# Patient Record
Sex: MALE | Race: WHITE | Employment: FULL TIME | URBAN - METROPOLITAN AREA
[De-identification: names, ages, dates, MRNs, and addresses within clinical notes are randomized per-mention and may not be internally consistent; named-entity substitution may affect disease eponyms.]

---

## 2019-11-30 ENCOUNTER — APPOINTMENT (OUTPATIENT)
Dept: GENERAL RADIOLOGY | Facility: HOSPITAL | Age: 64
DRG: 247 | End: 2019-11-30
Attending: EMERGENCY MEDICINE
Payer: COMMERCIAL

## 2019-11-30 ENCOUNTER — HOSPITAL ENCOUNTER (INPATIENT)
Facility: HOSPITAL | Age: 64
LOS: 1 days | Discharge: HOME | DRG: 247 | End: 2019-12-01
Attending: EMERGENCY MEDICINE | Admitting: HOSPITALIST
Payer: COMMERCIAL

## 2019-11-30 ENCOUNTER — APPOINTMENT (OUTPATIENT)
Dept: CV DIAGNOSTICS | Facility: HOSPITAL | Age: 64
DRG: 247 | End: 2019-11-30
Attending: INTERNAL MEDICINE
Payer: COMMERCIAL

## 2019-11-30 ENCOUNTER — APPOINTMENT (OUTPATIENT)
Dept: INTERVENTIONAL RADIOLOGY/VASCULAR | Facility: HOSPITAL | Age: 64
DRG: 247 | End: 2019-11-30
Attending: EMERGENCY MEDICINE
Payer: COMMERCIAL

## 2019-11-30 DIAGNOSIS — R77.8 ELEVATED TROPONIN: ICD-10-CM

## 2019-11-30 DIAGNOSIS — I10 BENIGN ESSENTIAL HTN: ICD-10-CM

## 2019-11-30 DIAGNOSIS — I24.9 ACUTE CORONARY SYNDROME (HCC): Primary | ICD-10-CM

## 2019-11-30 PROBLEM — R79.89 ELEVATED TROPONIN: Status: ACTIVE | Noted: 2019-11-30

## 2019-11-30 PROBLEM — I20.0 UNSTABLE ANGINA (HCC): Status: ACTIVE | Noted: 2019-11-30

## 2019-11-30 PROCEDURE — 99222 1ST HOSP IP/OBS MODERATE 55: CPT | Performed by: HOSPITALIST

## 2019-11-30 PROCEDURE — 99253 IP/OBS CNSLTJ NEW/EST LOW 45: CPT | Performed by: INTERNAL MEDICINE

## 2019-11-30 PROCEDURE — 93458 L HRT ARTERY/VENTRICLE ANGIO: CPT | Performed by: INTERNAL MEDICINE

## 2019-11-30 PROCEDURE — B211YZZ FLUOROSCOPY OF MULTIPLE CORONARY ARTERIES USING OTHER CONTRAST: ICD-10-PCS | Performed by: INTERNAL MEDICINE

## 2019-11-30 PROCEDURE — 93306 TTE W/DOPPLER COMPLETE: CPT | Performed by: INTERNAL MEDICINE

## 2019-11-30 PROCEDURE — B41FYZZ FLUOROSCOPY OF RIGHT LOWER EXTREMITY ARTERIES USING OTHER CONTRAST: ICD-10-PCS | Performed by: INTERNAL MEDICINE

## 2019-11-30 PROCEDURE — 71045 X-RAY EXAM CHEST 1 VIEW: CPT | Performed by: EMERGENCY MEDICINE

## 2019-11-30 PROCEDURE — 99255 IP/OBS CONSLTJ NEW/EST HI 80: CPT | Performed by: INTERNAL MEDICINE

## 2019-11-30 PROCEDURE — 3E033GC INTRODUCTION OF OTHER THERAPEUTIC SUBSTANCE INTO PERIPHERAL VEIN, PERCUTANEOUS APPROACH: ICD-10-PCS | Performed by: INTERNAL MEDICINE

## 2019-11-30 PROCEDURE — 0271356 DILATION OF CORONARY ARTERY, TWO ARTERIES, BIFURCATION, WITH TWO DRUG-ELUTING INTRALUMINAL DEVICES, PERCUTANEOUS APPROACH: ICD-10-PCS | Performed by: INTERNAL MEDICINE

## 2019-11-30 PROCEDURE — 92941 PRQ TRLML REVSC TOT OCCL AMI: CPT | Performed by: INTERNAL MEDICINE

## 2019-11-30 PROCEDURE — 4A023N7 MEASUREMENT OF CARDIAC SAMPLING AND PRESSURE, LEFT HEART, PERCUTANEOUS APPROACH: ICD-10-PCS | Performed by: INTERNAL MEDICINE

## 2019-11-30 RX ORDER — BIVALIRUDIN 250 MG/5ML
INJECTION, POWDER, LYOPHILIZED, FOR SOLUTION INTRAVENOUS
Status: COMPLETED
Start: 2019-11-30 | End: 2019-11-30

## 2019-11-30 RX ORDER — METOPROLOL TARTRATE 5 MG/5ML
5 INJECTION INTRAVENOUS ONCE
Status: COMPLETED | OUTPATIENT
Start: 2019-11-30 | End: 2019-11-30

## 2019-11-30 RX ORDER — ASPIRIN 81 MG/1
324 TABLET, CHEWABLE ORAL ONCE
Status: DISCONTINUED | OUTPATIENT
Start: 2019-11-30 | End: 2019-11-30

## 2019-11-30 RX ORDER — ONDANSETRON 2 MG/ML
4 INJECTION INTRAMUSCULAR; INTRAVENOUS EVERY 6 HOURS PRN
Status: DISCONTINUED | OUTPATIENT
Start: 2019-11-30 | End: 2019-12-01

## 2019-11-30 RX ORDER — ASPIRIN 81 MG/1
81 TABLET ORAL DAILY
Status: DISCONTINUED | OUTPATIENT
Start: 2019-11-30 | End: 2019-12-01

## 2019-11-30 RX ORDER — NITROGLYCERIN 20 MG/100ML
40 INJECTION INTRAVENOUS CONTINUOUS
Status: DISCONTINUED | OUTPATIENT
Start: 2019-11-30 | End: 2019-12-01

## 2019-11-30 RX ORDER — ONDANSETRON 2 MG/ML
4 INJECTION INTRAMUSCULAR; INTRAVENOUS EVERY 4 HOURS PRN
Status: DISCONTINUED | OUTPATIENT
Start: 2019-11-30 | End: 2019-11-30

## 2019-11-30 RX ORDER — NITROGLYCERIN 20 MG/100ML
INJECTION INTRAVENOUS CONTINUOUS
Status: DISCONTINUED | OUTPATIENT
Start: 2019-11-30 | End: 2019-11-30

## 2019-11-30 RX ORDER — MIDAZOLAM HYDROCHLORIDE 1 MG/ML
INJECTION INTRAMUSCULAR; INTRAVENOUS
Status: COMPLETED
Start: 2019-11-30 | End: 2019-11-30

## 2019-11-30 RX ORDER — SODIUM CHLORIDE 9 MG/ML
INJECTION, SOLUTION INTRAVENOUS CONTINUOUS
Status: ACTIVE | OUTPATIENT
Start: 2019-11-30 | End: 2019-11-30

## 2019-11-30 RX ORDER — ACETAMINOPHEN 325 MG/1
650 TABLET ORAL EVERY 6 HOURS PRN
Status: DISCONTINUED | OUTPATIENT
Start: 2019-11-30 | End: 2019-12-01

## 2019-11-30 RX ORDER — METOCLOPRAMIDE HYDROCHLORIDE 5 MG/ML
10 INJECTION INTRAMUSCULAR; INTRAVENOUS EVERY 8 HOURS PRN
Status: DISCONTINUED | OUTPATIENT
Start: 2019-11-30 | End: 2019-12-01

## 2019-11-30 RX ORDER — LIDOCAINE HYDROCHLORIDE 10 MG/ML
INJECTION, SOLUTION EPIDURAL; INFILTRATION; INTRACAUDAL; PERINEURAL
Status: COMPLETED
Start: 2019-11-30 | End: 2019-11-30

## 2019-11-30 RX ORDER — ASPIRIN 81 MG/1
81 TABLET ORAL DAILY
Status: DISCONTINUED | OUTPATIENT
Start: 2019-12-01 | End: 2019-11-30

## 2019-11-30 RX ORDER — ATORVASTATIN CALCIUM 80 MG/1
80 TABLET, FILM COATED ORAL NIGHTLY
Status: DISCONTINUED | OUTPATIENT
Start: 2019-11-30 | End: 2019-12-01

## 2019-11-30 RX ORDER — SODIUM CHLORIDE 9 MG/ML
INJECTION, SOLUTION INTRAVENOUS CONTINUOUS
Status: DISCONTINUED | OUTPATIENT
Start: 2019-11-30 | End: 2019-11-30

## 2019-11-30 RX ORDER — ASPIRIN 81 MG/1
325 TABLET, CHEWABLE ORAL NIGHTLY
Status: ON HOLD | COMMUNITY
End: 2019-12-01

## 2019-11-30 NOTE — CONSULTS
Cardiology (consult dictated)    Assessment:  1. Acute myocardial infarction, likely circumflex distribution. 2.  Known CAD, status post PCI approximately 1999.    3.  Well-documented heparin allergy    Plan:  1.   Emergency cardiac catheterization, pos

## 2019-11-30 NOTE — PROCEDURES
Preop: Acute myocardial infarction  Postop: Acute myocardial infarction  Procedure: Left heart cath, coronary angiogram.  PCI of circumflex with SREEKANTH. Angio-Seal to RFA. Findings: LAD 50% proximal to mid vessel.   RCA totally occluded with good collatera

## 2019-11-30 NOTE — PROCEDURES
[facility]    PATIENT'S NAME: Guerrero Becerra   ATTENDING PHYSICIAN: Emerson Ríos M.D. OPERATING PHYSICIAN: Maye Schuster M.D.    PATIENT ACCOUNT#:   [de-identified]    LOCATION:  Count includes the [facility] [id number] [facility]  MEDICAL RECORD #:   [id number]       DATE OF BIRTH:  [birth year] the proximal vessel, which begins before then extends into what appears to be a previous stent. Degree of stenosis 70% to 80%. The culprit lesion is the bifurcation of the marginal and posterolateral branches in the distal circumflex.   Subtotal stenosis patency. We then did primary stenting of the main circumflex trunk extending into the second marginal branch using a 3.0 x 23 mm drug-eluting stent with excellent results.   It was necessary to predilate the more proximal vessel in order to pass the stent,

## 2019-11-30 NOTE — DIETARY NOTE
Nutrition Short Note    Dietitian consult received per cardiac rehab/CHF protocol. Pt to be educated by cardiac rehab staff and encouraged to attend outpatient classes taught by RD. SYDNIE available PRN.     Dillon Diego RD, LDN, CSP, CNSC

## 2019-11-30 NOTE — CONSULTS
[facility]    PATIENT'S NAME: Jane Flynn   ATTENDING PHYSICIAN: IMLAN Argueta: Korin Lino M.D.    PATIENT ACCOUNT#:   [de-identified]    LOCATION:  [address] [place] [address]  MEDICAL RECORD #:   [id number]       DATE OF BIRTH:  [birth year] click.  ABDOMEN:  Soft, nontender. Bowel sounds present. No organomegaly, masses, bruits, or pulsations. EXTREMITIES:  Warm and dry. Pulses strong and equal.  No cyanosis, clubbing, or edema. No calf, thigh, or popliteal tenderness.    NEUROLOGIC:  No

## 2019-11-30 NOTE — ED NOTES
PATIENT HANDOFF AT BEDSIDE TO CATH LAB MD [facility], [address] [facility] LAB TEAM  REPORT GIVEN TO Bay Boyd [id number] RN

## 2019-11-30 NOTE — PLAN OF CARE
Received pt from Cath lab s/p L heart cath, stentsx2 to circumflex. R groin angioseal CDI. Pulses + CMS intact. On NTG gtt at 40 mcg/min, IVF at 50 cchr x 4 hrs. 12 lead EKG done and Dr. Gloria Velázquez reviewed it.

## 2019-11-30 NOTE — ED INITIAL ASSESSMENT (HPI)
Radha PAULINO 16. Chest radiating to arm when pt lies down  + arm tingling  + stent (L dec art) 1999

## 2019-11-30 NOTE — CONSULTS
[facility] Consult Note    Reason for Consult: History of HIT  Medical Record Number: [id number]   CSN: [id number]   Referring Physician: No ref.  provider found  PCP: None Pcp    History of Present Illness: 64M with a PMH of CAD and \"hep level: Not on file    Tobacco Use      Smoking status: Current Every Day Smoker        Packs/day: 0.50        Years: 25.00        Pack years: 12.5        Types: Cigarettes      Smokeless tobacco: Never Used    Substance and Sexual Activity      Alcohol use follow peripherally     [facility]

## 2019-11-30 NOTE — ED PROVIDER NOTES
Artery disease. He had a stent to his LAD in 1999.   Patient Seen in: [facility] Emergency Department      History   Patient presents with:  Chest Pain Angina (cardiovascular)    Stated Complaint: [address] Chest radiating to arm when pt lies down src --    SpO2 11/30/19 0200 95 %   O2 Device --        Current:/74   Pulse 75   Resp 16   Ht 185.4 cm (6' 1\")   Wt 95.3 kg   SpO2 97%   BMI 27.71 kg/m²         Physical Exam    General:  Patient is alert and oriented x3. No acute distress.   Well-d PLATELET.   Procedure                               Abnormality         Status                     ---------                               -----------         ------                     CBC W/ DIFFERENTIAL[[id number]]                              Final resul bilateral arm discomfort. He states that his chest pain had resolved. However his EKG showed persistent inferior lateral ST segment depression but with new J-point elevation anteriorly concerning for ST segment elevation myocardial infarction.   Cardiac a

## 2019-11-30 NOTE — H&P
[facility]  History and Physical     Tyrelreina Hightower Patient Status:  Emergency    10/4/1955 MRN [id number]   Location [facility] Attending Jaiden Alvarez MD   Hosp Day # 0 PCP None Pcp     Chief Complaint: Chest pain    H membranes. EOM-I. PERRLA. Anicteric. Neck: No lymphadenopathy. No JVD. No carotid bruits. Respiratory: Clear to auscultation bilaterally. No wheezes. No rhonchi. Cardiovascular: S1, S2. Regular rate and rhythm. No murmurs, rubs or gallops. Equal pulses.

## 2019-12-01 VITALS
OXYGEN SATURATION: 98 % | WEIGHT: 207.88 LBS | HEART RATE: 66 BPM | DIASTOLIC BLOOD PRESSURE: 76 MMHG | HEIGHT: 73 IN | BODY MASS INDEX: 27.55 KG/M2 | RESPIRATION RATE: 15 BRPM | TEMPERATURE: 98 F | SYSTOLIC BLOOD PRESSURE: 104 MMHG

## 2019-12-01 PROCEDURE — 99239 HOSP IP/OBS DSCHRG MGMT >30: CPT | Performed by: STUDENT IN AN ORGANIZED HEALTH CARE EDUCATION/TRAINING PROGRAM

## 2019-12-01 PROCEDURE — 99232 SBSQ HOSP IP/OBS MODERATE 35: CPT | Performed by: INTERNAL MEDICINE

## 2019-12-01 RX ORDER — ASPIRIN 81 MG/1
81 TABLET ORAL DAILY
Qty: 90 TABLET | Refills: 1 | Status: SHIPPED | OUTPATIENT
Start: 2019-12-01

## 2019-12-01 RX ORDER — POTASSIUM CHLORIDE 20 MEQ/1
40 TABLET, EXTENDED RELEASE ORAL ONCE
Status: COMPLETED | OUTPATIENT
Start: 2019-12-01 | End: 2019-12-01

## 2019-12-01 RX ORDER — ATORVASTATIN CALCIUM 80 MG/1
80 TABLET, FILM COATED ORAL NIGHTLY
Qty: 90 TABLET | Refills: 1 | Status: SHIPPED | OUTPATIENT
Start: 2019-12-01

## 2019-12-01 NOTE — DISCHARGE SUMMARY
[facility] PSYCHIATRIC [place] HOSPITALIST  DISCHARGE SUMMARY     Kiera Rascon Patient Status:  Inpatient    10/4/1955 MRN [id number]   [facility] 6NE-A Attending Matty Medley MD   [facility] Day # 1 PCP None Pcp     Date of Admission: 2019  Date of Discharge: daily.   Quantity:  90 tablet  Refills:  1     atorvastatin 80 MG Tabs  Commonly known as:  LIPITOR      Take 1 tablet (80 mg total) by mouth nightly.    Quantity:  90 tablet  Refills:  1     metoprolol Tartrate 25 MG Tabs  Commonly known as:  LOPRESSOR Musculoskeletal: Moves all extremities. Extremities: No edema.   -----------------------------------------------------------------------------------------------  PATIENT DISCHARGE INSTRUCTIONS: See electronic chart    Saad Mendez MD    Time spent:  > 3

## 2019-12-01 NOTE — PLAN OF CARE
PT doing will, denies chest pain or arm pain since PCI yesterday. Cardiac telemetry showing NSR without ectopy. Pt ambulating without difficulty and tolerating cardiac diet.  PT seen by Dr Jose Edwards , Cardiology and instructions given regarding medications,

## 2019-12-01 NOTE — PLAN OF CARE
Assumed care of pt at 1615. Pt denies any chest pain or sob. NSR with sbp wnl. Right groin incision c/d/i, soft, no hematoma. +1 right pedal pulse. Pt ambulating in [place], tolerated well.

## 2019-12-01 NOTE — PLAN OF CARE
Pt awaiting discharge. Pt states dc instructions and prescriptions provided by and reviewed by Barbara Carolina. Pt able to teach back follow up appts, new meds, groin site care and complication recognition. PIV x2 dc'd. Pt collected belongings.  Pt escorted to famil

## 2019-12-01 NOTE — PROGRESS NOTES
Assumed pt. Care at 2200. Pt. A & O x4, VSS, HORN. Pt. Denies any chest pain or SOB. R groin soft, no hematoma, pulses are palpable. Ambulating appropriately. Questions and concerns addressed w/ pt. Will continue to monitor closely.

## 2019-12-01 NOTE — PROGRESS NOTES
[facility] Cardiology Progress Note    Patient seen and examined.  Chart reviewed.  Discussed with RN    No chest pain or shortness of breath.     /67   Pulse 70   Temp 97.8 °F (36.6 °C) (Temporal)   Resp 11   Ht 6' 1\" (1.854 m)   Wt 207 lb 14.3 oz (94.3 kg Daily  Perflutren Lipid Microsphere (DEFINITY) 6.52 MG/ML injection 1.5 mL, 1.5 mL, Intravenous, ONCE PRN    Cath 11/30/2019:  1.      Successful intervention of the distal circumflex with placement of a 3.0 x 23 mm drug-eluting stent extending into the sec

## 2019-12-02 ENCOUNTER — PATIENT OUTREACH (OUTPATIENT)
Dept: CASE MANAGEMENT | Age: 64
End: 2019-12-02

## 2019-12-02 DIAGNOSIS — Z02.9 ENCOUNTERS FOR UNSPECIFIED ADMINISTRATIVE PURPOSE: ICD-10-CM

## 2019-12-02 NOTE — PROGRESS NOTES
Pt has an appt at [facility] on 12/3/19. TCC was ordered at d/c.    for pt to call NCM back for TCM.

## 2019-12-03 ENCOUNTER — NURSE ONLY (OUTPATIENT)
Dept: CARDIAC REHAB | Facility: HOSPITAL | Age: 64
End: 2019-12-03

## 2019-12-03 ENCOUNTER — OFFICE VISIT (OUTPATIENT)
Dept: INTERNAL MEDICINE CLINIC | Facility: CLINIC | Age: 64
End: 2019-12-03

## 2019-12-03 VITALS
RESPIRATION RATE: 18 BRPM | DIASTOLIC BLOOD PRESSURE: 60 MMHG | HEIGHT: 73 IN | WEIGHT: 209 LBS | SYSTOLIC BLOOD PRESSURE: 112 MMHG | BODY MASS INDEX: 27.7 KG/M2 | TEMPERATURE: 98 F | HEART RATE: 66 BPM | OXYGEN SATURATION: 98 %

## 2019-12-03 DIAGNOSIS — I21.3 ACUTE ST ELEVATION MYOCARDIAL INFARCTION (STEMI), UNSPECIFIED ARTERY (HCC): Primary | ICD-10-CM

## 2019-12-03 DIAGNOSIS — Z88.8 HEPARIN ALLERGY: ICD-10-CM

## 2019-12-03 DIAGNOSIS — I24.9 ACUTE CORONARY SYNDROME (HCC): ICD-10-CM

## 2019-12-03 PROCEDURE — 99495 TRANSJ CARE MGMT MOD F2F 14D: CPT | Performed by: CLINICAL NURSE SPECIALIST

## 2019-12-03 PROCEDURE — 1111F DSCHRG MED/CURRENT MED MERGE: CPT | Performed by: CLINICAL NURSE SPECIALIST

## 2019-12-03 NOTE — CARDIAC REHAB
Cardiac rehab received consult for education post MI and stent. However, patient was [address] on Sunday prior to us seeing him. Stent card mailed to his residence. Message left for patient to call back.   Patient will not be able to do phase 2 CR here due t

## 2019-12-03 NOTE — PROGRESS NOTES
Timothy Bridges 6      HISTORY   CHIEF COMPLAINT: post hospital follow up visit  HPI: Miguel Mathew is a 62year old male here today for follow up after being hospitalized for chest pain.  Miguel Mathew was discharged f Social History    Tobacco Use      Smoking status: Current Every Day Smoker        Packs/day: 0.50        Years: 25.00        Pack years: 12.5        Types: Cigarettes      Smokeless tobacco: Never Used    Alcohol use: Yes      Frequency: Monthly or less infection  HEENT: atraumatic, normocephalic  LUNGS: clear to auscultation bilaterally, no wheezes, rhonchi or rales, normal respiratory effort  CARDIO: S1, S2, RRR without audible murmur  GI: + BS to all quadrants  MUSCULOSKELETAL: + ROM in all joints  EXT listed below in orders Assisted in scheduling required follow-up with community providers and services. Medication Reconciliation:  I am aware of an inpatient discharge within the last 30 days.   The discharge medication list has been reconciled with the

## 2019-12-03 NOTE — PATIENT INSTRUCTIONS
Patient Instructions:  1. The referral for cardiology has been placed. 2. If you develop chest pain or shortness of breath, go to the emergency room right away.   3.  Cardiology appointment   Dr. Hershal Schaumann Nurse Practitioner   Thursday 12/12/19 [phone]

## 2019-12-03 NOTE — PROGRESS NOTES
[facility] PHARMACIST MEDICATION RECONCILIATION        Charis Dasilva MRN [id number]    10/4/1955 PCP None Pcp       Comments: Medication history completed in [address] [place] [address] by pharmacist with patient.      The following medicatio

## 2019-12-05 ENCOUNTER — OFFICE VISIT (OUTPATIENT)
Dept: INTERNAL MEDICINE CLINIC | Facility: CLINIC | Age: 64
End: 2019-12-05

## 2019-12-05 ENCOUNTER — TELEPHONE (OUTPATIENT)
Dept: INTERNAL MEDICINE CLINIC | Facility: CLINIC | Age: 64
End: 2019-12-05

## 2019-12-05 ENCOUNTER — LAB ENCOUNTER (OUTPATIENT)
Dept: LAB | Age: 64
End: 2019-12-05
Attending: INTERNAL MEDICINE
Payer: COMMERCIAL

## 2019-12-05 VITALS
BODY MASS INDEX: 27.08 KG/M2 | WEIGHT: 206.5 LBS | TEMPERATURE: 98 F | DIASTOLIC BLOOD PRESSURE: 64 MMHG | HEART RATE: 63 BPM | HEIGHT: 73.35 IN | RESPIRATION RATE: 14 BRPM | OXYGEN SATURATION: 98 % | SYSTOLIC BLOOD PRESSURE: 122 MMHG

## 2019-12-05 DIAGNOSIS — I21.3 ACUTE ST ELEVATION MYOCARDIAL INFARCTION (STEMI), UNSPECIFIED ARTERY (HCC): Primary | ICD-10-CM

## 2019-12-05 DIAGNOSIS — Z12.11 COLON CANCER SCREENING: ICD-10-CM

## 2019-12-05 DIAGNOSIS — F17.200 TOBACCO DEPENDENCE: ICD-10-CM

## 2019-12-05 DIAGNOSIS — I21.3 ACUTE ST ELEVATION MYOCARDIAL INFARCTION (STEMI), UNSPECIFIED ARTERY (HCC): ICD-10-CM

## 2019-12-05 DIAGNOSIS — I24.9 ACUTE CORONARY SYNDROME (HCC): ICD-10-CM

## 2019-12-05 DIAGNOSIS — I10 ESSENTIAL HYPERTENSION: ICD-10-CM

## 2019-12-05 PROBLEM — R77.8 ELEVATED TROPONIN: Status: RESOLVED | Noted: 2019-11-30 | Resolved: 2019-12-05

## 2019-12-05 PROBLEM — R79.89 ELEVATED TROPONIN: Status: RESOLVED | Noted: 2019-11-30 | Resolved: 2019-12-05

## 2019-12-05 PROBLEM — I20.0 UNSTABLE ANGINA (HCC): Status: RESOLVED | Noted: 2019-11-30 | Resolved: 2019-12-05

## 2019-12-05 PROBLEM — R73.03 PRE-DIABETES: Status: ACTIVE | Noted: 2019-12-05

## 2019-12-05 PROCEDURE — 1111F DSCHRG MED/CURRENT MED MERGE: CPT | Performed by: INTERNAL MEDICINE

## 2019-12-05 PROCEDURE — 99204 OFFICE O/P NEW MOD 45 MIN: CPT | Performed by: INTERNAL MEDICINE

## 2019-12-05 PROCEDURE — 83036 HEMOGLOBIN GLYCOSYLATED A1C: CPT

## 2019-12-05 PROCEDURE — 36415 COLL VENOUS BLD VENIPUNCTURE: CPT

## 2019-12-05 NOTE — PAYOR COMM NOTE
--------------  ADMISSION REVIEW     Payor: [address] [place] [address] #:  [id number]  Authorization Number: [id number]    Admit date: 11/30/19  Admit time: 6221       Admitting Physician: Alex Valdez MD  Attending Physician:  Eulalia att. Smokeless tobacco: Never Used    Alcohol use: Not on file    Drug use: Not on file             Review of Systems    Positive for stated complaint: + L Center Chest radiating to arm when pt lies down  Other systems are as noted in HPI.   Constitutional a TROPONIN I - Abnormal; Notable for the following components:    Troponin 0.141 (*)     All other components within normal limits   PRO BETA NATRIURETIC PEPTIDE - Abnormal; Notable for the following components:    Pro-Beta Natriuretic Peptide 201 (*)     Al The patient was administered [nitroglycerin drip, Lopressor 5 mg IV ] medications for the purposes of treating  [acute coronary syndrome]. Patient states that he has an allergy to heparin with antibodies to heparin.   Occurred during his prior cardiac cath [facility]  History and Physical     Miguel Mathew Patient Status:  Emergency    10/4/1955 MRN [id number]   Location [facility] Attending Colleen Carson MD   Hosp Day # 0 PCP None Pcp     Chief Complaint: Chest amy Neck: No lymphadenopathy. No JVD. No carotid bruits. Respiratory: Clear to auscultation bilaterally. No wheezes. No rhonchi. Cardiovascular: S1, S2. Regular rate and rhythm. No murmurs, rubs or gallops. Equal pulses.    Chest and Back: No tenderness or de Procedure: Left heart cath, coronary angiogram.  PCI of circumflex with SREEKANTH. Angio-Seal to RFA.     Findings: LAD 50% proximal to mid vessel. RCA totally occluded with good collateralization.   Culprit is circumflex with a distal bifurcation subtotally oc History of Present Illness: 62M with a PMH of CAD and \"heparin allergy\" presented on 11/30/19 with chest pain. CP radiated to L arm. No reliving factors. EKG/Tn concerning for ACS/STEMI.  He underwent cardiac catheterization on 11/30/19 with PCI with SREEKANTH Problem Relation Age of Onset   • Diabetes Father     • Hypertension Father     • Other (Other) Father     • Diabetes Mother           Physical Exam  /75   Pulse 71   Temp 97.2 °F (36.2 °C) (Temporal)   Resp 11   Ht 1.854 m (6' 1\")   Wt 95.4 kg (210 PATIENT'S NAME: Fady Acosta   ATTENDING PHYSICIAN: MILAN Johns Ruby: Archana Worthington M.D.    PATIENT ACCOUNT#:   [de-identified]    LOCATION:  [address] [place], [place] [address] [facility]  MEDICAL RECORD #:   [id number]       YOB: 1955  ADMISSION D ABDOMEN:  Soft, nontender. Bowel sounds present. No organomegaly, masses, bruits, or pulsations. EXTREMITIES:  Warm and dry. Pulses strong and equal.  No cyanosis, clubbing, or edema. No calf, thigh, or popliteal tenderness.    NEUROLOGIC:  Normal.  S

## 2019-12-05 NOTE — PROGRESS NOTES
[facility]    CHIEF COMPLAINT:  Patient presents with:  Hospital F/U: Heart Issues  Other: New Patient  Imm/Inj: refused flu Shot        HISTORY OF PRESENT ILLNESS:  The patient is a 62year old year old male who presents with recent hospitalziza Medications   Medication Sig Dispense Refill   • atorvastatin 80 MG Oral Tab Take 1 tablet (80 mg total) by mouth nightly. 90 tablet 1   • metoprolol Tartrate 25 MG Oral Tab Take 1 tablet (25 mg total) by mouth 2 (two) times daily.  180 tablet 1   • ticagre organization: Not on file        Attends meetings of clubs or organizations: Not on file        Relationship status: Not on file      Intimate partner violence:        Fear of current or ex partner: Not on file        Emotionally abused: Not on file BP: 129 / 75  MRN:  [id number]    Age:  64years    Wt:  (210lb) HR: 70bpm  Loc:  EDW        Gndr: M          BSA: 2.2m^2     Sonographer: Semaj Garcia  Ordering:    Sammy Culp MD  Consulting:  Dariel Guido MD  Referring:   MD Leeann Woods     --- mmol/L    CO2 28.0 21.0 - 32.0 mmol/L    Anion Gap 1 0 - 18 mmol/L    BUN 28 (H) 7 - 18 mg/dL    Creatinine 1.08 0.70 - 1.30 mg/dL    BUN/CREA Ratio 25.9 (H) 10.0 - 20.0    Calcium, Total 8.4 (L) 8.5 - 10.1 mg/dL    Calculated Osmolality 298 (H) 275 - 295 rate 69 BPM    Atrial rate 69 BPM    P-R Interval 178 ms    QRS Duration 96 ms    Q-T Interval 366 ms    QTC Calculation (Bezet) 392 ms    P Axis 73 degrees    R Axis 1 degrees    T Axis -61 degrees   EKG 12-LEAD   Result Value Ref Range    Ventricular rat Basophil Absolute 0.05 0.00 - 0.20 x10(3) uL    Immature Granulocyte Absolute 0.02 0.00 - 1.00 x10(3) uL    Neutrophil % 60.9 %    Lymphocyte % 29.3 %    Monocyte % 7.1 %    Eosinophil % 1.9 %    Basophil % 0.6 %    Immature Granulocyte % 0.2 %       AS

## 2019-12-05 NOTE — TELEPHONE ENCOUNTER
Called and spoke with lab. [address] lab is a send-out lab, staff states that [facility] for send out lab is giving an approximate result expected date of 12/6/19.

## 2019-12-05 NOTE — PROGRESS NOTES
Blood glucose (sugar) is elevated, not in diabetic range- this may mean there is risk for diabetes. No medications needed at this time.  Follow guidelines for heart healthy diet- exercise 150 minutes weekly, maintain or try to achieve ideal body weight, mod

## 2019-12-06 NOTE — PROGRESS NOTES
Left detailed message per HIPAA. Made aware of results & recommendations. Advised to call back if has any questions.

## 2019-12-11 RX ORDER — ASPIRIN 81 MG/1
81 TABLET ORAL DAILY
COMMUNITY
Start: 2019-12-01

## 2019-12-11 RX ORDER — ROSUVASTATIN CALCIUM 10 MG/1
TABLET, FILM COATED ORAL
COMMUNITY
End: 2020-02-01 | Stop reason: ALTCHOICE

## 2019-12-11 RX ORDER — NEBIVOLOL 5 MG/1
TABLET ORAL
COMMUNITY
End: 2019-12-12 | Stop reason: ALTCHOICE

## 2019-12-11 RX ORDER — ATORVASTATIN CALCIUM 80 MG/1
80 TABLET, FILM COATED ORAL
COMMUNITY
Start: 2019-12-01 | End: 2020-06-09 | Stop reason: SDUPTHER

## 2019-12-12 ENCOUNTER — OFFICE VISIT (OUTPATIENT)
Dept: CARDIOLOGY | Age: 64
End: 2019-12-12

## 2019-12-12 VITALS
HEIGHT: 73 IN | BODY MASS INDEX: 27.57 KG/M2 | WEIGHT: 208 LBS | DIASTOLIC BLOOD PRESSURE: 60 MMHG | SYSTOLIC BLOOD PRESSURE: 116 MMHG | HEART RATE: 52 BPM

## 2019-12-12 DIAGNOSIS — E78.5 HYPERLIPIDEMIA, UNSPECIFIED HYPERLIPIDEMIA TYPE: ICD-10-CM

## 2019-12-12 DIAGNOSIS — I25.10 CORONARY ARTERY DISEASE INVOLVING NATIVE CORONARY ARTERY OF NATIVE HEART WITHOUT ANGINA PECTORIS: Primary | ICD-10-CM

## 2019-12-12 DIAGNOSIS — Z98.61 POST PTCA: ICD-10-CM

## 2019-12-12 PROCEDURE — 99203 OFFICE O/P NEW LOW 30 MIN: CPT | Performed by: NURSE PRACTITIONER

## 2019-12-12 RX ORDER — ATORVASTATIN CALCIUM 80 MG/1
80 TABLET, FILM COATED ORAL AT BEDTIME
COMMUNITY
End: 2020-02-01 | Stop reason: SDUPTHER

## 2019-12-12 RX ORDER — METOPROLOL SUCCINATE 25 MG/1
25 TABLET, EXTENDED RELEASE ORAL DAILY
Qty: 90 TABLET | Refills: 4 | Status: SHIPPED | OUTPATIENT
Start: 2019-12-12 | End: 2021-04-12 | Stop reason: SDUPTHER

## 2019-12-12 RX ORDER — LISINOPRIL 5 MG/1
5 TABLET ORAL DAILY
Qty: 90 TABLET | Refills: 4 | Status: SHIPPED | OUTPATIENT
Start: 2019-12-12 | End: 2021-05-21

## 2019-12-12 ASSESSMENT — ENCOUNTER SYMPTOMS
CHILLS: 0
WEIGHT GAIN: 0
SUSPICIOUS LESIONS: 0
SYNCOPE: 0
SHORTNESS OF BREATH: 0
DECREASED APPETITE: 0
HEMOPTYSIS: 0
GASTROINTESTINAL NEGATIVE: 1
COUGH: 0
DIAPHORESIS: 0
ALLERGIC/IMMUNOLOGIC COMMENTS: NO NEW FOOD ALLERGIES
HEMATOCHEZIA: 0
BRUISES/BLEEDS EASILY: 0
FEVER: 0
WEIGHT LOSS: 0
NEUROLOGICAL NEGATIVE: 1

## 2019-12-12 ASSESSMENT — PATIENT HEALTH QUESTIONNAIRE - PHQ9
2. FEELING DOWN, DEPRESSED OR HOPELESS: NOT AT ALL
1. LITTLE INTEREST OR PLEASURE IN DOING THINGS: NOT AT ALL
SUM OF ALL RESPONSES TO PHQ9 QUESTIONS 1 AND 2: 0
SUM OF ALL RESPONSES TO PHQ9 QUESTIONS 1 AND 2: 0

## 2019-12-12 NOTE — PAYOR COMM NOTE
--------------  DISCHARGE REVIEW    Payor: [address] [place] [address] #:  [id number]  Authorization Number: [id number]    Admit date: 11/30/19  Admit time:  0458  Discharge Date: 12/1/2019 11:03 AM     Admitting Physician: Boaz Head

## 2019-12-27 ENCOUNTER — HOSPITAL ENCOUNTER (OUTPATIENT)
Dept: CV DIAGNOSTICS | Facility: HOSPITAL | Age: 64
Discharge: HOME | End: 2019-12-27
Attending: NURSE PRACTITIONER
Payer: COMMERCIAL

## 2019-12-27 DIAGNOSIS — I25.10 CAD (CORONARY ARTERY DISEASE): ICD-10-CM

## 2019-12-27 DIAGNOSIS — Z98.61 POST PTCA: ICD-10-CM

## 2019-12-27 PROCEDURE — 93017 CV STRESS TEST TRACING ONLY: CPT | Performed by: NURSE PRACTITIONER

## 2019-12-27 PROCEDURE — 93018 CV STRESS TEST I&R ONLY: CPT | Performed by: NURSE PRACTITIONER

## 2019-12-30 ENCOUNTER — TELEPHONE (OUTPATIENT)
Dept: CARDIOLOGY | Age: 64
End: 2019-12-30

## 2020-02-19 ENCOUNTER — OFFICE VISIT (OUTPATIENT)
Dept: CARDIOLOGY | Age: 65
End: 2020-02-19

## 2020-02-19 VITALS
BODY MASS INDEX: 27.3 KG/M2 | HEART RATE: 66 BPM | WEIGHT: 206 LBS | SYSTOLIC BLOOD PRESSURE: 122 MMHG | HEIGHT: 73 IN | DIASTOLIC BLOOD PRESSURE: 60 MMHG

## 2020-02-19 DIAGNOSIS — I10 ESSENTIAL HYPERTENSION, BENIGN: ICD-10-CM

## 2020-02-19 DIAGNOSIS — I21.21 ST ELEVATION MYOCARDIAL INFARCTION INVOLVING LEFT CIRCUMFLEX CORONARY ARTERY (CMD): ICD-10-CM

## 2020-02-19 DIAGNOSIS — E78.5 DYSLIPIDEMIA: ICD-10-CM

## 2020-02-19 DIAGNOSIS — I25.10 CORONARY ARTERY DISEASE INVOLVING NATIVE HEART WITHOUT ANGINA PECTORIS, UNSPECIFIED VESSEL OR LESION TYPE: Primary | ICD-10-CM

## 2020-02-19 PROBLEM — I21.3 STEMI (ST ELEVATION MYOCARDIAL INFARCTION) (CMD): Status: ACTIVE | Noted: 2020-02-19

## 2020-02-19 PROCEDURE — 99214 OFFICE O/P EST MOD 30 MIN: CPT | Performed by: INTERNAL MEDICINE

## 2020-02-19 PROCEDURE — 3078F DIAST BP <80 MM HG: CPT | Performed by: INTERNAL MEDICINE

## 2020-02-19 PROCEDURE — 3074F SYST BP LT 130 MM HG: CPT | Performed by: INTERNAL MEDICINE

## 2020-02-19 SDOH — HEALTH STABILITY: PHYSICAL HEALTH: ON AVERAGE, HOW MANY MINUTES DO YOU ENGAGE IN EXERCISE AT THIS LEVEL?: 0 MIN

## 2020-02-19 SDOH — HEALTH STABILITY: PHYSICAL HEALTH: ON AVERAGE, HOW MANY DAYS PER WEEK DO YOU ENGAGE IN MODERATE TO STRENUOUS EXERCISE (LIKE A BRISK WALK)?: 0 DAYS

## 2020-02-19 ASSESSMENT — PATIENT HEALTH QUESTIONNAIRE - PHQ9
SUM OF ALL RESPONSES TO PHQ9 QUESTIONS 1 AND 2: 0
SUM OF ALL RESPONSES TO PHQ9 QUESTIONS 1 AND 2: 0
2. FEELING DOWN, DEPRESSED OR HOPELESS: NOT AT ALL
1. LITTLE INTEREST OR PLEASURE IN DOING THINGS: NOT AT ALL

## 2020-02-19 ASSESSMENT — ENCOUNTER SYMPTOMS
CHILLS: 0
SUSPICIOUS LESIONS: 0
WEIGHT GAIN: 0
HEMOPTYSIS: 0
BRUISES/BLEEDS EASILY: 0
COUGH: 0
FEVER: 0
HEMATOCHEZIA: 0
ALLERGIC/IMMUNOLOGIC COMMENTS: NO NEW FOOD ALLERGIES
WEIGHT LOSS: 0
ROS SKIN COMMENTS: BRUISING

## 2020-03-14 ENCOUNTER — LAB ENCOUNTER (OUTPATIENT)
Dept: LAB | Age: 65
End: 2020-03-14
Attending: INTERNAL MEDICINE
Payer: COMMERCIAL

## 2020-03-14 DIAGNOSIS — B35.0 AMIANTACEA BARBAE: Primary | ICD-10-CM

## 2020-03-14 DIAGNOSIS — E78.00 PURE HYPERCHOLESTEROLEMIA: ICD-10-CM

## 2020-03-14 LAB
CHOLEST SMN-MCNC: 146 MG/DL (ref ?–200)
HDLC SERPL-MCNC: 33 MG/DL (ref 40–59)
LDLC SERPL CALC-MCNC: 84 MG/DL (ref ?–100)
NONHDLC SERPL-MCNC: 113 MG/DL (ref ?–130)
PATIENT FASTING Y/N/NP: YES
TRIGL SERPL-MCNC: 146 MG/DL (ref 30–149)
VLDLC SERPL CALC-MCNC: 29 MG/DL (ref 0–30)

## 2020-03-14 PROCEDURE — 80061 LIPID PANEL: CPT

## 2020-03-14 PROCEDURE — 36415 COLL VENOUS BLD VENIPUNCTURE: CPT

## 2020-06-09 ENCOUNTER — TELEPHONE (OUTPATIENT)
Dept: CARDIOLOGY | Age: 65
End: 2020-06-09

## 2020-06-09 RX ORDER — ATORVASTATIN CALCIUM 80 MG/1
80 TABLET, FILM COATED ORAL DAILY
Qty: 90 TABLET | Refills: 0 | Status: SHIPPED | OUTPATIENT
Start: 2020-06-09 | End: 2020-10-02 | Stop reason: SDUPTHER

## 2020-10-05 RX ORDER — ATORVASTATIN CALCIUM 80 MG/1
80 TABLET, FILM COATED ORAL DAILY
Qty: 90 TABLET | Refills: 1 | Status: SHIPPED | OUTPATIENT
Start: 2020-10-05 | End: 2021-04-27 | Stop reason: SDUPTHER

## 2021-03-10 ENCOUNTER — PATIENT OUTREACH (OUTPATIENT)
Dept: INTERNAL MEDICINE CLINIC | Facility: CLINIC | Age: 66
End: 2021-03-10

## 2021-04-12 ENCOUNTER — TELEPHONE (OUTPATIENT)
Dept: CARDIOLOGY | Age: 66
End: 2021-04-12

## 2021-04-13 RX ORDER — METOPROLOL SUCCINATE 25 MG/1
25 TABLET, EXTENDED RELEASE ORAL DAILY
Qty: 90 TABLET | Refills: 0 | Status: SHIPPED | OUTPATIENT
Start: 2021-04-13

## 2021-04-27 ENCOUNTER — TELEPHONE (OUTPATIENT)
Dept: CARDIOLOGY | Age: 66
End: 2021-04-27

## 2021-04-28 RX ORDER — ATORVASTATIN CALCIUM 80 MG/1
80 TABLET, FILM COATED ORAL DAILY
Qty: 90 TABLET | Refills: 0 | Status: SHIPPED | OUTPATIENT
Start: 2021-04-28

## 2021-04-30 ENCOUNTER — TELEPHONE (OUTPATIENT)
Dept: CARDIOLOGY | Age: 66
End: 2021-04-30

## 2021-04-30 DIAGNOSIS — I25.10 CORONARY ARTERY DISEASE INVOLVING NATIVE HEART WITHOUT ANGINA PECTORIS, UNSPECIFIED VESSEL OR LESION TYPE: ICD-10-CM

## 2021-04-30 DIAGNOSIS — E78.5 DYSLIPIDEMIA: Primary | ICD-10-CM

## 2021-05-14 ENCOUNTER — LAB ENCOUNTER (OUTPATIENT)
Dept: LAB | Age: 66
End: 2021-05-14
Attending: INTERNAL MEDICINE
Payer: COMMERCIAL

## 2021-05-14 DIAGNOSIS — E78.5 DYSLIPIDEMIA: Primary | ICD-10-CM

## 2021-05-14 DIAGNOSIS — I25.10 CORONARY ATHEROSCLEROSIS OF NATIVE CORONARY ARTERY: ICD-10-CM

## 2021-05-14 PROCEDURE — 36415 COLL VENOUS BLD VENIPUNCTURE: CPT

## 2021-05-14 PROCEDURE — 85025 COMPLETE CBC W/AUTO DIFF WBC: CPT

## 2021-05-14 PROCEDURE — 80061 LIPID PANEL: CPT

## 2021-05-14 PROCEDURE — 80053 COMPREHEN METABOLIC PANEL: CPT

## 2021-05-20 ENCOUNTER — PATIENT OUTREACH (OUTPATIENT)
Dept: INTERNAL MEDICINE CLINIC | Facility: CLINIC | Age: 66
End: 2021-05-20

## 2021-05-20 PROBLEM — E78.5 DYSLIPIDEMIA: Status: ACTIVE | Noted: 2020-02-19

## 2021-05-20 PROBLEM — I21.3 STEMI (ST ELEVATION MYOCARDIAL INFARCTION) (HCC): Status: ACTIVE | Noted: 2020-02-19

## 2021-05-20 PROBLEM — I25.10 ATHEROSCLEROSIS OF CORONARY ARTERY: Status: ACTIVE | Noted: 2020-02-19

## 2021-05-21 ENCOUNTER — OFFICE VISIT (OUTPATIENT)
Dept: CARDIOLOGY | Age: 66
End: 2021-05-21

## 2021-05-21 ENCOUNTER — TELEPHONE (OUTPATIENT)
Dept: INTERNAL MEDICINE CLINIC | Facility: CLINIC | Age: 66
End: 2021-05-21

## 2021-05-21 VITALS
DIASTOLIC BLOOD PRESSURE: 80 MMHG | WEIGHT: 204 LBS | HEART RATE: 64 BPM | BODY MASS INDEX: 26.18 KG/M2 | SYSTOLIC BLOOD PRESSURE: 136 MMHG | HEIGHT: 74 IN

## 2021-05-21 DIAGNOSIS — E78.5 DYSLIPIDEMIA: ICD-10-CM

## 2021-05-21 DIAGNOSIS — I21.21 ST ELEVATION MYOCARDIAL INFARCTION INVOLVING LEFT CIRCUMFLEX CORONARY ARTERY (CMD): Primary | ICD-10-CM

## 2021-05-21 DIAGNOSIS — I10 ESSENTIAL HYPERTENSION, BENIGN: ICD-10-CM

## 2021-05-21 DIAGNOSIS — I24.9 ACUTE CORONARY SYNDROME (HCC): ICD-10-CM

## 2021-05-21 DIAGNOSIS — I21.3 ACUTE ST ELEVATION MYOCARDIAL INFARCTION (STEMI), UNSPECIFIED ARTERY (HCC): Primary | ICD-10-CM

## 2021-05-21 DIAGNOSIS — I25.10 CORONARY ARTERY DISEASE INVOLVING NATIVE HEART WITHOUT ANGINA PECTORIS, UNSPECIFIED VESSEL OR LESION TYPE: ICD-10-CM

## 2021-05-21 PROCEDURE — 3075F SYST BP GE 130 - 139MM HG: CPT | Performed by: NURSE PRACTITIONER

## 2021-05-21 PROCEDURE — 3079F DIAST BP 80-89 MM HG: CPT | Performed by: NURSE PRACTITIONER

## 2021-05-21 PROCEDURE — 99214 OFFICE O/P EST MOD 30 MIN: CPT | Performed by: NURSE PRACTITIONER

## 2021-05-21 ASSESSMENT — PATIENT HEALTH QUESTIONNAIRE - PHQ9
SUM OF ALL RESPONSES TO PHQ9 QUESTIONS 1 AND 2: 0
CLINICAL INTERPRETATION OF PHQ2 SCORE: NO FURTHER SCREENING NEEDED
CLINICAL INTERPRETATION OF PHQ9 SCORE: NO FURTHER SCREENING NEEDED
1. LITTLE INTEREST OR PLEASURE IN DOING THINGS: NOT AT ALL
SUM OF ALL RESPONSES TO PHQ9 QUESTIONS 1 AND 2: 0
2. FEELING DOWN, DEPRESSED OR HOPELESS: NOT AT ALL

## 2021-05-21 NOTE — TELEPHONE ENCOUNTER
Is pt up to date on OVs with PCP?: no  Has pt been seen/referred for condition?: yes  Is specialist in network?: yes, IHP confirmed  Health Maintenance up to date?: no         If no, reminder given?: yes    Pending referral order for 1 visit.   Pt at Dr. Yaritza Julien

## 2021-05-21 NOTE — TELEPHONE ENCOUNTER
Referral signed for 1 visit. Called pt back. Advised of 1 visit referral only until seen for OV. Noted last seen here 12/5/19. Pt states will call back after cardiology OV to schedule PE.    PSR - Please follow-up with pt to schedule.  Care Gaps & OVs v

## 2021-06-02 ENCOUNTER — TELEPHONE (OUTPATIENT)
Dept: INTERNAL MEDICINE CLINIC | Facility: CLINIC | Age: 66
End: 2021-06-02

## 2021-06-02 NOTE — TELEPHONE ENCOUNTER
Left message to schedule overdue physical.  Last appointment in office was 12/5/19.   MyChart letter was sent on May 21st.

## 2021-08-13 ENCOUNTER — LAB ENCOUNTER (OUTPATIENT)
Dept: LAB | Age: 66
End: 2021-08-13
Attending: INTERNAL MEDICINE
Payer: COMMERCIAL

## 2021-08-13 ENCOUNTER — OFFICE VISIT (OUTPATIENT)
Dept: INTERNAL MEDICINE CLINIC | Facility: CLINIC | Age: 66
End: 2021-08-13

## 2021-08-13 VITALS
DIASTOLIC BLOOD PRESSURE: 64 MMHG | SYSTOLIC BLOOD PRESSURE: 112 MMHG | HEART RATE: 67 BPM | TEMPERATURE: 98 F | HEIGHT: 73.35 IN | WEIGHT: 207 LBS | RESPIRATION RATE: 16 BRPM | BODY MASS INDEX: 27.14 KG/M2 | OXYGEN SATURATION: 99 %

## 2021-08-13 DIAGNOSIS — Z00.00 ROUTINE GENERAL MEDICAL EXAMINATION AT A HEALTH CARE FACILITY: Primary | ICD-10-CM

## 2021-08-13 DIAGNOSIS — Z12.11 COLON CANCER SCREENING: ICD-10-CM

## 2021-08-13 DIAGNOSIS — Z00.00 ROUTINE GENERAL MEDICAL EXAMINATION AT A HEALTH CARE FACILITY: ICD-10-CM

## 2021-08-13 PROBLEM — R73.03 PRE-DIABETES: Status: RESOLVED | Noted: 2019-12-05 | Resolved: 2021-08-13

## 2021-08-13 PROBLEM — Z95.5 STATUS POST INSERTION OF DRUG ELUTING CORONARY ARTERY STENT: Status: ACTIVE | Noted: 2021-08-13

## 2021-08-13 PROBLEM — I25.2 HISTORY OF ST ELEVATION MYOCARDIAL INFARCTION (STEMI): Status: ACTIVE | Noted: 2021-08-13

## 2021-08-13 PROBLEM — I25.10 ATHEROSCLEROSIS OF CORONARY ARTERY: Status: RESOLVED | Noted: 2020-02-19 | Resolved: 2021-08-13

## 2021-08-13 PROBLEM — I21.3 STEMI (ST ELEVATION MYOCARDIAL INFARCTION) (HCC): Status: RESOLVED | Noted: 2020-02-19 | Resolved: 2021-08-13

## 2021-08-13 LAB — COMPLEXED PSA SERPL-MCNC: 0.5 NG/ML (ref ?–4)

## 2021-08-13 PROCEDURE — 3078F DIAST BP <80 MM HG: CPT | Performed by: INTERNAL MEDICINE

## 2021-08-13 PROCEDURE — 3074F SYST BP LT 130 MM HG: CPT | Performed by: INTERNAL MEDICINE

## 2021-08-13 PROCEDURE — 36415 COLL VENOUS BLD VENIPUNCTURE: CPT

## 2021-08-13 PROCEDURE — 3008F BODY MASS INDEX DOCD: CPT | Performed by: INTERNAL MEDICINE

## 2021-08-13 PROCEDURE — 99396 PREV VISIT EST AGE 40-64: CPT | Performed by: INTERNAL MEDICINE

## 2021-08-13 NOTE — PROGRESS NOTES
HPI:   Jaiden Joseph is a 60year old male who presents for a complete physical exam. 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  Children: 38 y/o son, 38 y/o son, 37 y/o dtr, 2 step sons , Exercise work mucous membranes moist, no pallor  NECK: supple,no adenopathy,no bruits, no TM  LUNGS: clear to auscultation and percussion  CARDIO: RRR without murmur or gallop  GI: good BS's,no masses, HSM or tenderness  MUSCULOSKELETAL: back is not tender, nl lordosis. 5. 68 1.50 - 7.70 x10(3) uL    Lymphocyte Absolute 2.23 1.00 - 4.00 x10(3) uL    Monocyte Absolute 0.65 0.10 - 1.00 x10(3) uL    Eosinophil Absolute 0.19 0.00 - 0.70 x10(3) uL    Basophil Absolute 0.06 0.00 - 0.20 x10(3) uL    Immature Granulocyte Absolute

## 2022-01-19 ENCOUNTER — PATIENT OUTREACH (OUTPATIENT)
Dept: INTERNAL MEDICINE CLINIC | Facility: CLINIC | Age: 67
End: 2022-01-19

## 2022-01-19 NOTE — PROGRESS NOTES
Due for colon cancer screening. Treasure msg sent reminding him to schedule, please follow up. Thanks.

## 2022-07-27 ENCOUNTER — PATIENT MESSAGE (OUTPATIENT)
Dept: INTERNAL MEDICINE CLINIC | Facility: CLINIC | Age: 67
End: 2022-07-27

## 2022-07-27 DIAGNOSIS — I21.3 ACUTE ST ELEVATION MYOCARDIAL INFARCTION (STEMI), UNSPECIFIED ARTERY (HCC): Primary | ICD-10-CM

## 2022-07-29 NOTE — TELEPHONE ENCOUNTER
Is pt up to date on OVs with PCP?: yes 8/12/2021 (come back in 1 year for CPX)  Has pt been seen/referred for condition?: yes  Is specialist in network?: yes  Health Maintenance up to date?: no reminder to schedule PE and complete HM sent to pt [facility]         If no, reminder given?: yes

## 2022-07-29 NOTE — TELEPHONE ENCOUNTER
From: Ollie Buerger  To: Kandy Spence MD  Sent: 7/27/2022 5:20 PM CDT  Subject: Referral for Appointment with Dr. Romeo Rodriguez on my insurance requirements, I need a referral for an appointment with Dr. Cody Pak for August 24th - this is a wellness check with renewal of medication.

## 2022-08-09 ENCOUNTER — PATIENT OUTREACH (OUTPATIENT)
Dept: INTERNAL MEDICINE CLINIC | Facility: CLINIC | Age: 67
End: 2022-08-09

## 2022-08-17 NOTE — PROGRESS NOTES
Future Appointments   Date Time Provider Jose Miller   8/24/2022  2:00 PM Gabriel Kemp, APRN [facility]

## 2022-08-24 ENCOUNTER — OFFICE VISIT (OUTPATIENT)
Dept: INTERNAL MEDICINE CLINIC | Facility: CLINIC | Age: 67
End: 2022-08-24
Payer: COMMERCIAL

## 2022-08-24 VITALS
HEART RATE: 70 BPM | WEIGHT: 201.81 LBS | OXYGEN SATURATION: 96 % | BODY MASS INDEX: 27.64 KG/M2 | DIASTOLIC BLOOD PRESSURE: 54 MMHG | HEIGHT: 71.5 IN | RESPIRATION RATE: 14 BRPM | SYSTOLIC BLOOD PRESSURE: 112 MMHG | TEMPERATURE: 99 F

## 2022-08-24 DIAGNOSIS — Z13.1 SCREENING FOR DIABETES MELLITUS: ICD-10-CM

## 2022-08-24 DIAGNOSIS — E78.5 DYSLIPIDEMIA: ICD-10-CM

## 2022-08-24 DIAGNOSIS — Z95.5 STATUS POST INSERTION OF DRUG ELUTING CORONARY ARTERY STENT: ICD-10-CM

## 2022-08-24 DIAGNOSIS — F17.200 TOBACCO DEPENDENCE: ICD-10-CM

## 2022-08-24 DIAGNOSIS — Z00.00 ENCOUNTER FOR ANNUAL PHYSICAL EXAM: Primary | ICD-10-CM

## 2022-08-24 DIAGNOSIS — Z23 NEED FOR VACCINATION: ICD-10-CM

## 2022-08-24 DIAGNOSIS — Z12.11 ENCOUNTER FOR SCREENING FOR MALIGNANT NEOPLASM OF COLON: ICD-10-CM

## 2022-08-24 DIAGNOSIS — Z12.5 SCREENING FOR PROSTATE CANCER: ICD-10-CM

## 2022-08-24 DIAGNOSIS — I25.10 CORONARY ARTERY DISEASE INVOLVING NATIVE CORONARY ARTERY OF NATIVE HEART WITHOUT ANGINA PECTORIS: ICD-10-CM

## 2022-08-24 DIAGNOSIS — Z13.29 SCREENING FOR THYROID DISORDER: ICD-10-CM

## 2022-08-24 DIAGNOSIS — I10 ESSENTIAL HYPERTENSION: ICD-10-CM

## 2022-08-24 PROBLEM — I21.3 ACUTE ST ELEVATION MYOCARDIAL INFARCTION (STEMI) (HCC): Status: RESOLVED | Noted: 2019-12-03 | Resolved: 2022-08-24

## 2022-08-24 PROCEDURE — 3008F BODY MASS INDEX DOCD: CPT | Performed by: NURSE PRACTITIONER

## 2022-08-24 PROCEDURE — 90471 IMMUNIZATION ADMIN: CPT | Performed by: NURSE PRACTITIONER

## 2022-08-24 PROCEDURE — 3078F DIAST BP <80 MM HG: CPT | Performed by: NURSE PRACTITIONER

## 2022-08-24 PROCEDURE — 90472 IMMUNIZATION ADMIN EACH ADD: CPT | Performed by: NURSE PRACTITIONER

## 2022-08-24 PROCEDURE — 90715 TDAP VACCINE 7 YRS/> IM: CPT | Performed by: NURSE PRACTITIONER

## 2022-08-24 PROCEDURE — 99397 PER PM REEVAL EST PAT 65+ YR: CPT | Performed by: NURSE PRACTITIONER

## 2022-08-24 PROCEDURE — 90677 PCV20 VACCINE IM: CPT | Performed by: NURSE PRACTITIONER

## 2022-08-24 PROCEDURE — 3074F SYST BP LT 130 MM HG: CPT | Performed by: NURSE PRACTITIONER

## 2022-08-24 PROCEDURE — 99406 BEHAV CHNG SMOKING 3-10 MIN: CPT | Performed by: NURSE PRACTITIONER

## 2022-08-24 RX ORDER — METOPROLOL SUCCINATE 25 MG/1
25 TABLET, EXTENDED RELEASE ORAL DAILY
COMMUNITY
Start: 2022-07-28

## 2022-08-25 ENCOUNTER — LAB ENCOUNTER (OUTPATIENT)
Dept: LAB | Age: 67
End: 2022-08-25
Attending: NURSE PRACTITIONER
Payer: COMMERCIAL

## 2022-08-25 ENCOUNTER — LAB ENCOUNTER (OUTPATIENT)
Dept: LAB | Age: 67
End: 2022-08-25
Attending: INTERNAL MEDICINE
Payer: COMMERCIAL

## 2022-08-25 DIAGNOSIS — D64.9 ANEMIA, UNSPECIFIED TYPE: ICD-10-CM

## 2022-08-25 DIAGNOSIS — Z12.5 SCREENING FOR PROSTATE CANCER: ICD-10-CM

## 2022-08-25 DIAGNOSIS — I10 ESSENTIAL HYPERTENSION, MALIGNANT: ICD-10-CM

## 2022-08-25 DIAGNOSIS — Z13.29 SCREENING FOR THYROID DISORDER: ICD-10-CM

## 2022-08-25 DIAGNOSIS — E78.5 HYPERLIPEMIA: ICD-10-CM

## 2022-08-25 DIAGNOSIS — I25.10 CORONARY ATHEROSCLEROSIS OF NATIVE CORONARY ARTERY: ICD-10-CM

## 2022-08-25 DIAGNOSIS — Z00.00 ENCOUNTER FOR ANNUAL PHYSICAL EXAM: ICD-10-CM

## 2022-08-25 DIAGNOSIS — I10 ESSENTIAL HYPERTENSION: ICD-10-CM

## 2022-08-25 DIAGNOSIS — I25.10 CORONARY ATHEROSCLEROSIS OF NATIVE CORONARY ARTERY: Primary | ICD-10-CM

## 2022-08-25 DIAGNOSIS — Z13.1 SCREENING FOR DIABETES MELLITUS: ICD-10-CM

## 2022-08-25 LAB
ALBUMIN SERPL-MCNC: 3.6 G/DL (ref 3.4–5)
ALBUMIN/GLOB SERPL: 1 {RATIO} (ref 1–2)
ALP LIVER SERPL-CCNC: 80 U/L
ALT SERPL-CCNC: 24 U/L
ANION GAP SERPL CALC-SCNC: 4 MMOL/L (ref 0–18)
AST SERPL-CCNC: 16 U/L (ref 15–37)
BASOPHILS # BLD AUTO: 0.04 X10(3) UL (ref 0–0.2)
BASOPHILS NFR BLD AUTO: 0.4 %
BILIRUB SERPL-MCNC: 0.5 MG/DL (ref 0.1–2)
BUN BLD-MCNC: 20 MG/DL (ref 7–18)
CALCIUM BLD-MCNC: 9.2 MG/DL (ref 8.5–10.1)
CHLORIDE SERPL-SCNC: 110 MMOL/L (ref 98–112)
CHOLEST SERPL-MCNC: 125 MG/DL (ref ?–200)
CO2 SERPL-SCNC: 27 MMOL/L (ref 21–32)
COMPLEXED PSA SERPL-MCNC: 0.84 NG/ML (ref ?–4)
CREAT BLD-MCNC: 1.05 MG/DL
EOSINOPHIL # BLD AUTO: 0.12 X10(3) UL (ref 0–0.7)
EOSINOPHIL NFR BLD AUTO: 1.3 %
ERYTHROCYTE [DISTWIDTH] IN BLOOD BY AUTOMATED COUNT: 13 %
EST. AVERAGE GLUCOSE BLD GHB EST-MCNC: 120 MG/DL (ref 68–126)
FASTING PATIENT LIPID ANSWER: YES
FASTING STATUS PATIENT QL REPORTED: YES
GFR SERPLBLD BASED ON 1.73 SQ M-ARVRAT: 79 ML/MIN/1.73M2 (ref 60–?)
GLOBULIN PLAS-MCNC: 3.5 G/DL (ref 2.8–4.4)
GLUCOSE BLD-MCNC: 91 MG/DL (ref 70–99)
HBA1C MFR BLD: 5.8 % (ref ?–5.7)
HCT VFR BLD AUTO: 38.9 %
HDLC SERPL-MCNC: 32 MG/DL (ref 40–59)
HGB BLD-MCNC: 12.5 G/DL
IMM GRANULOCYTES # BLD AUTO: 0.02 X10(3) UL (ref 0–1)
IMM GRANULOCYTES NFR BLD: 0.2 %
LDLC SERPL CALC-MCNC: 68 MG/DL (ref ?–100)
LYMPHOCYTES # BLD AUTO: 2.02 X10(3) UL (ref 1–4)
LYMPHOCYTES NFR BLD AUTO: 22.3 %
MCH RBC QN AUTO: 29.4 PG (ref 26–34)
MCHC RBC AUTO-ENTMCNC: 32.1 G/DL (ref 31–37)
MCV RBC AUTO: 91.5 FL
MONOCYTES # BLD AUTO: 0.56 X10(3) UL (ref 0.1–1)
MONOCYTES NFR BLD AUTO: 6.2 %
NEUTROPHILS # BLD AUTO: 6.31 X10 (3) UL (ref 1.5–7.7)
NEUTROPHILS # BLD AUTO: 6.31 X10(3) UL (ref 1.5–7.7)
NEUTROPHILS NFR BLD AUTO: 69.6 %
NONHDLC SERPL-MCNC: 93 MG/DL (ref ?–130)
OSMOLALITY SERPL CALC.SUM OF ELEC: 294 MOSM/KG (ref 275–295)
PLATELET # BLD AUTO: 202 10(3)UL (ref 150–450)
POTASSIUM SERPL-SCNC: 4.5 MMOL/L (ref 3.5–5.1)
PROT SERPL-MCNC: 7.1 G/DL (ref 6.4–8.2)
RBC # BLD AUTO: 4.25 X10(6)UL
SODIUM SERPL-SCNC: 141 MMOL/L (ref 136–145)
TRIGL SERPL-MCNC: 143 MG/DL (ref 30–149)
TSI SER-ACNC: 1.71 MIU/ML (ref 0.36–3.74)
VLDLC SERPL CALC-MCNC: 22 MG/DL (ref 0–30)
WBC # BLD AUTO: 9.1 X10(3) UL (ref 4–11)

## 2022-08-25 PROCEDURE — 84443 ASSAY THYROID STIM HORMONE: CPT

## 2022-08-25 PROCEDURE — 85025 COMPLETE CBC W/AUTO DIFF WBC: CPT

## 2022-08-25 PROCEDURE — 82728 ASSAY OF FERRITIN: CPT

## 2022-08-25 PROCEDURE — 83036 HEMOGLOBIN GLYCOSYLATED A1C: CPT

## 2022-08-25 PROCEDURE — 80061 LIPID PANEL: CPT

## 2022-08-25 PROCEDURE — 80053 COMPREHEN METABOLIC PANEL: CPT

## 2022-08-25 PROCEDURE — 83540 ASSAY OF IRON: CPT

## 2022-08-25 PROCEDURE — 36415 COLL VENOUS BLD VENIPUNCTURE: CPT

## 2022-08-25 PROCEDURE — 83550 IRON BINDING TEST: CPT

## 2022-08-26 LAB
DEPRECATED HBV CORE AB SER IA-ACNC: 131.7 NG/ML
IRON SATN MFR SERPL: 19 %
IRON SERPL-MCNC: 59 UG/DL
TIBC SERPL-MCNC: 313 UG/DL (ref 240–450)
TRANSFERRIN SERPL-MCNC: 210 MG/DL (ref 200–360)

## 2022-08-26 NOTE — PROGRESS NOTES
Spoke to patient, aware of results and recommendations. Patient voice understandings.    No additional labs were added, please advise if you wish to add additional labs

## 2023-03-03 ENCOUNTER — TELEPHONE (OUTPATIENT)
Dept: INTERNAL MEDICINE CLINIC | Facility: CLINIC | Age: 68
End: 2023-03-03

## 2024-03-11 ENCOUNTER — LAB ENCOUNTER (OUTPATIENT)
Dept: LAB | Age: 69
End: 2024-03-11
Attending: STUDENT IN AN ORGANIZED HEALTH CARE EDUCATION/TRAINING PROGRAM
Payer: COMMERCIAL

## 2024-03-11 ENCOUNTER — OFFICE VISIT (OUTPATIENT)
Dept: INTERNAL MEDICINE CLINIC | Facility: CLINIC | Age: 69
End: 2024-03-11
Payer: COMMERCIAL

## 2024-03-11 VITALS
WEIGHT: 202 LBS | TEMPERATURE: 98 F | RESPIRATION RATE: 20 BRPM | BODY MASS INDEX: 26.77 KG/M2 | HEART RATE: 60 BPM | OXYGEN SATURATION: 95 % | HEIGHT: 73 IN | SYSTOLIC BLOOD PRESSURE: 112 MMHG | DIASTOLIC BLOOD PRESSURE: 60 MMHG

## 2024-03-11 DIAGNOSIS — I25.10 ATHEROSCLEROSIS OF NATIVE CORONARY ARTERY OF NATIVE HEART WITHOUT ANGINA PECTORIS: ICD-10-CM

## 2024-03-11 DIAGNOSIS — F17.200 CURRENT SMOKER: ICD-10-CM

## 2024-03-11 DIAGNOSIS — Z13.29 SCREENING FOR THYROID DISORDER: ICD-10-CM

## 2024-03-11 DIAGNOSIS — Z12.5 PROSTATE CANCER SCREENING: ICD-10-CM

## 2024-03-11 DIAGNOSIS — Z12.11 COLON CANCER SCREENING: ICD-10-CM

## 2024-03-11 DIAGNOSIS — I10 ESSENTIAL HYPERTENSION: ICD-10-CM

## 2024-03-11 DIAGNOSIS — E55.9 VITAMIN D DEFICIENCY: ICD-10-CM

## 2024-03-11 DIAGNOSIS — Z95.5 STATUS POST INSERTION OF DRUG ELUTING CORONARY ARTERY STENT: ICD-10-CM

## 2024-03-11 DIAGNOSIS — Z13.29 SCREENING FOR ENDOCRINE, METABOLIC AND IMMUNITY DISORDER: ICD-10-CM

## 2024-03-11 DIAGNOSIS — Z13.0 SCREENING FOR DEFICIENCY ANEMIA: ICD-10-CM

## 2024-03-11 DIAGNOSIS — D64.9 ANEMIA, UNSPECIFIED TYPE: ICD-10-CM

## 2024-03-11 DIAGNOSIS — Z13.0 SCREENING FOR ENDOCRINE, METABOLIC AND IMMUNITY DISORDER: ICD-10-CM

## 2024-03-11 DIAGNOSIS — Z13.228 SCREENING FOR ENDOCRINE, METABOLIC AND IMMUNITY DISORDER: ICD-10-CM

## 2024-03-11 DIAGNOSIS — Z13.1 SCREENING FOR DIABETES MELLITUS: ICD-10-CM

## 2024-03-11 DIAGNOSIS — I25.2 HISTORY OF ST ELEVATION MYOCARDIAL INFARCTION (STEMI): ICD-10-CM

## 2024-03-11 DIAGNOSIS — R55 SYNCOPE, UNSPECIFIED SYNCOPE TYPE: ICD-10-CM

## 2024-03-11 DIAGNOSIS — E78.2 MIXED HYPERLIPIDEMIA: ICD-10-CM

## 2024-03-11 DIAGNOSIS — Z00.00 PHYSICAL EXAM, ANNUAL: Primary | ICD-10-CM

## 2024-03-11 LAB
ALBUMIN SERPL-MCNC: 3.9 G/DL (ref 3.4–5)
ALBUMIN/GLOB SERPL: 1.1 {RATIO} (ref 1–2)
ALP LIVER SERPL-CCNC: 73 U/L
ALT SERPL-CCNC: 19 U/L
ANION GAP SERPL CALC-SCNC: 3 MMOL/L (ref 0–18)
AST SERPL-CCNC: 20 U/L (ref 15–37)
BASOPHILS # BLD AUTO: 0.06 X10(3) UL (ref 0–0.2)
BASOPHILS NFR BLD AUTO: 0.6 %
BILIRUB SERPL-MCNC: 0.4 MG/DL (ref 0.1–2)
BUN BLD-MCNC: 20 MG/DL (ref 9–23)
CALCIUM BLD-MCNC: 9.4 MG/DL (ref 8.5–10.1)
CHLORIDE SERPL-SCNC: 111 MMOL/L (ref 98–112)
CHOLEST SERPL-MCNC: 255 MG/DL (ref ?–200)
CO2 SERPL-SCNC: 28 MMOL/L (ref 21–32)
COMPLEXED PSA SERPL-MCNC: 0.9 NG/ML (ref ?–4)
CREAT BLD-MCNC: 1.05 MG/DL
DEPRECATED HBV CORE AB SER IA-ACNC: 104 NG/ML
EGFRCR SERPLBLD CKD-EPI 2021: 78 ML/MIN/1.73M2 (ref 60–?)
EOSINOPHIL # BLD AUTO: 0.21 X10(3) UL (ref 0–0.7)
EOSINOPHIL NFR BLD AUTO: 1.9 %
ERYTHROCYTE [DISTWIDTH] IN BLOOD BY AUTOMATED COUNT: 12.8 %
EST. AVERAGE GLUCOSE BLD GHB EST-MCNC: 131 MG/DL (ref 68–126)
FASTING PATIENT LIPID ANSWER: YES
FASTING STATUS PATIENT QL REPORTED: YES
FOLATE SERPL-MCNC: 20.2 NG/ML (ref 8.7–?)
GLOBULIN PLAS-MCNC: 3.7 G/DL (ref 2.8–4.4)
GLUCOSE BLD-MCNC: 101 MG/DL (ref 70–99)
HBA1C MFR BLD: 6.2 % (ref ?–5.7)
HCT VFR BLD AUTO: 42.1 %
HDLC SERPL-MCNC: 37 MG/DL (ref 40–59)
HGB BLD-MCNC: 13.8 G/DL
IMM GRANULOCYTES # BLD AUTO: 0.02 X10(3) UL (ref 0–1)
IMM GRANULOCYTES NFR BLD: 0.2 %
IRON SATN MFR SERPL: 20 %
IRON SERPL-MCNC: 69 UG/DL
LDLC SERPL CALC-MCNC: 186 MG/DL (ref ?–100)
LYMPHOCYTES # BLD AUTO: 1.76 X10(3) UL (ref 1–4)
LYMPHOCYTES NFR BLD AUTO: 16.3 %
MCH RBC QN AUTO: 28.4 PG (ref 26–34)
MCHC RBC AUTO-ENTMCNC: 32.8 G/DL (ref 31–37)
MCV RBC AUTO: 86.6 FL
MONOCYTES # BLD AUTO: 0.65 X10(3) UL (ref 0.1–1)
MONOCYTES NFR BLD AUTO: 6 %
NEUTROPHILS # BLD AUTO: 8.09 X10 (3) UL (ref 1.5–7.7)
NEUTROPHILS # BLD AUTO: 8.09 X10(3) UL (ref 1.5–7.7)
NEUTROPHILS NFR BLD AUTO: 75 %
NONHDLC SERPL-MCNC: 218 MG/DL (ref ?–130)
OSMOLALITY SERPL CALC.SUM OF ELEC: 297 MOSM/KG (ref 275–295)
PLATELET # BLD AUTO: 239 10(3)UL (ref 150–450)
POTASSIUM SERPL-SCNC: 4.3 MMOL/L (ref 3.5–5.1)
PROT SERPL-MCNC: 7.6 G/DL (ref 6.4–8.2)
RBC # BLD AUTO: 4.86 X10(6)UL
SODIUM SERPL-SCNC: 142 MMOL/L (ref 136–145)
TIBC SERPL-MCNC: 344 UG/DL (ref 240–450)
TRANSFERRIN SERPL-MCNC: 231 MG/DL (ref 200–360)
TRIGL SERPL-MCNC: 171 MG/DL (ref 30–149)
TSI SER-ACNC: 1.5 MIU/ML (ref 0.36–3.74)
VIT B12 SERPL-MCNC: 460 PG/ML (ref 193–986)
VIT D+METAB SERPL-MCNC: 28.4 NG/ML (ref 30–100)
VLDLC SERPL CALC-MCNC: 36 MG/DL (ref 0–30)
WBC # BLD AUTO: 10.8 X10(3) UL (ref 4–11)

## 2024-03-11 PROCEDURE — 83036 HEMOGLOBIN GLYCOSYLATED A1C: CPT

## 2024-03-11 PROCEDURE — 3074F SYST BP LT 130 MM HG: CPT | Performed by: STUDENT IN AN ORGANIZED HEALTH CARE EDUCATION/TRAINING PROGRAM

## 2024-03-11 PROCEDURE — 3078F DIAST BP <80 MM HG: CPT | Performed by: STUDENT IN AN ORGANIZED HEALTH CARE EDUCATION/TRAINING PROGRAM

## 2024-03-11 PROCEDURE — 36415 COLL VENOUS BLD VENIPUNCTURE: CPT

## 2024-03-11 PROCEDURE — 80061 LIPID PANEL: CPT

## 2024-03-11 PROCEDURE — 82728 ASSAY OF FERRITIN: CPT

## 2024-03-11 PROCEDURE — 83550 IRON BINDING TEST: CPT

## 2024-03-11 PROCEDURE — 83540 ASSAY OF IRON: CPT

## 2024-03-11 PROCEDURE — 82746 ASSAY OF FOLIC ACID SERUM: CPT

## 2024-03-11 PROCEDURE — 3008F BODY MASS INDEX DOCD: CPT | Performed by: STUDENT IN AN ORGANIZED HEALTH CARE EDUCATION/TRAINING PROGRAM

## 2024-03-11 PROCEDURE — 85025 COMPLETE CBC W/AUTO DIFF WBC: CPT

## 2024-03-11 PROCEDURE — 84443 ASSAY THYROID STIM HORMONE: CPT

## 2024-03-11 PROCEDURE — 99397 PER PM REEVAL EST PAT 65+ YR: CPT | Performed by: STUDENT IN AN ORGANIZED HEALTH CARE EDUCATION/TRAINING PROGRAM

## 2024-03-11 PROCEDURE — 82607 VITAMIN B-12: CPT

## 2024-03-11 PROCEDURE — 82306 VITAMIN D 25 HYDROXY: CPT

## 2024-03-11 PROCEDURE — 80053 COMPREHEN METABOLIC PANEL: CPT

## 2024-03-11 NOTE — PROGRESS NOTES
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  Eye exam: every year, wears glasses, no contacts , prescription changes every year.  Dentist: full dentures, not going to dentist  Driving: Yes, with the seatbelt  Sunscreen: yes, during summertime  Smokin cigarettes a day   Alcohol: socially , 2-3 drinks at once.  Smokes marijuana once a day after work    Vaccinations:  Influenza: Does not want to do flu shots.  COVID: Not Vaccinated.  RSV:  Due, will think about it  Pneumococcal: PCV 20 - 2022  Shingles: due, will think about it  Tdap/Td:  2022    Screenings:  Colonoscopy: Due, has not done that yet. Would like to try cologuard  PSA: 2022 - 0.84 , due  AAA: Due, Current smoker, 2 cigarettes a day    Past Medical History:   Past Medical History:   Diagnosis Date    Acute ST elevation myocardial infarction (STEMI) (HCC) 12/3/2019    Atherosclerosis of coronary artery 2020    Essential hypertension     Hyperlipidemia     Myocardial infarction (HCC)     Pre-diabetes 2019    Status post insertion of drug eluting coronary artery stent 2021    2005 LAD, 2020 Cx        Past Surgical History:   Past Surgical History:   Procedure Laterality Date    CATH BARE METAL STENT (BMS)      unsure    CATH DRUG ELUTING STENT  2019    circumflex x2    CATH PERCUTANEOUS  TRANSLUMINAL CORONARY ANGIOPLASTY      STENT PLACE PTA EA ADD ARTERY          Current Medications:    Current Outpatient Medications   Medication Sig Dispense Refill    metoprolol succinate ER 25 MG Oral Tablet 24 Hr Take 1 tablet (25 mg total) by mouth daily.      atorvastatin 80 MG Oral Tab Take 1 tablet (80 mg total) by mouth nightly. 90 tablet 1    aspirin 81 MG Oral Tab EC Take 1 tablet (81 mg total) by mouth daily. 90 tablet 1         Allergies:    Allergies as of 2024 - Review Complete 2024   Allergen Reaction Noted    Heparin BLEEDING 2019    Heparin OTHER (SEE COMMENTS) 2022       SOCIAL HISTORY:    Social History     Socioeconomic History    Marital status:      Spouse name: Not on file    Number of children: Not on file    Years of education: Not on file    Highest education level: Not on file   Occupational History    Not on file   Tobacco Use    Smoking status: Some Days     Packs/day: 0.00     Years: 25.00     Additional pack years: 0.00     Total pack years: 0.00     Types: Cigarettes     Last attempt to quit: 2019     Years since quittin.2     Passive exposure: Current    Smokeless tobacco: Never    Tobacco comments:     2 per day   Vaping Use    Vaping Use: Never used   Substance and Sexual Activity    Alcohol use: Yes     Comment: Maybe once per month    Drug use: Yes     Types: Cannabis     Comment: in s    Sexual activity: Yes     Partners: Female   Other Topics Concern    Caffeine Concern No    Exercise No    Seat Belt Yes    Special Diet No    Stress Concern No    Weight Concern No     Service No    Blood Transfusions No    Occupational Exposure Not Asked    Hobby Hazards Not Asked    Sleep Concern Not Asked    Back Care Not Asked    Bike Helmet Not Asked    Self-Exams Not Asked   Social History Narrative    Not on file     Social Determinants of Health     Financial Resource Strain: Not on file   Food Insecurity: Not on file   Transportation Needs: Not on file   Physical Activity: Not on file   Stress: Not on  file   Social Connections: Not on file   Housing Stability: Not on file       FAMILY HISTORY:   Family History   Problem Relation Age of Onset    Diabetes Father     Hypertension Father     Other (parkinsons) Father     Diabetes Mother         insulin shock    Kidney Disease Sister     Diabetes Brother     No Known Problems Brother     No Known Problems Sister     No Known Problems Sister        REVIEW OF SYSTEMS:  GENERAL: feels well otherwise  SKIN: denies any unusual skin lesions  EYES:denies blurred vision or double vision  HEENT: denies nasal congestion, sinus pain or ST\  LUNGS: denies shortness of breath with exertion  CARDIOVASCULAR: denies chest pain on exertion  GI: denies abdominal pain  : slightly weakened steam, no nocturia, denies dysuria  MUSCULOSKELETAL: denies back pain  NEURO: denies headaches  PSYCHE: denies depression or anxiety  HEMATOLOGIC: positive for hx of anemia  ENDOCRINE: denies thyroid history  ALL/ASTHMA: denies hx of allergy or asthma    EXERCISE ASSESSMENT AND COUNSELING  YES    FUNCTIONAL ASSESSMENT (Able to perform all ADLs):  Yes    BODY HABITUS AND NUTRITION STATUS:  Body mass index is 26.65 kg/m².      PHYSICAL EXAM:   /60 (BP Location: Left arm, Patient Position: Sitting, Cuff Size: large)   Pulse 60   Temp 98.1 °F (36.7 °C)   Resp 20   Ht 6' 1\" (1.854 m)   Wt 202 lb (91.6 kg)   SpO2 95%   BMI 26.65 kg/m²  Body mass index is 26.65 kg/m².     GENERAL: well developed, well nourished,in no apparent distress  SKIN: no rashes,no suspicious lesions  HEENT: atraumatic, normocephalic,ears and throat are clear  EYES:PERRLA, conjunctiva are clear  NECK: supple,no adenopathy  CHEST: no chest tenderness  LUNGS: clear to auscultation  CARDIO: nl s1 and s2, RRR without murmur  GI: good BS's,no masses, HSM or tenderness  MUSCULOSKELETAL: back is not tender,FROM of the back  EXTREMITIES: no cyanosis, clubbing or edema  NEURO: Oriented times three,cranial nerves are intact,motor  and sensory are grossly intact    Labs:   Lab Results   Component Value Date/Time    WBC 9.1 08/25/2022 07:34 AM    HGB 12.5 (L) 08/25/2022 07:34 AM    .0 08/25/2022 07:34 AM      Lab Results   Component Value Date/Time    GLU 91 08/25/2022 07:34 AM     08/25/2022 07:34 AM    K 4.5 08/25/2022 07:34 AM     08/25/2022 07:34 AM    CO2 27.0 08/25/2022 07:34 AM    CREATSERUM 1.05 08/25/2022 07:34 AM    CA 9.2 08/25/2022 07:34 AM    ALB 3.6 08/25/2022 07:34 AM    TP 7.1 08/25/2022 07:34 AM    ALKPHO 80 08/25/2022 07:34 AM    AST 16 08/25/2022 07:34 AM    ALT 24 08/25/2022 07:34 AM    BILT 0.5 08/25/2022 07:34 AM    TSH 1.710 08/25/2022 07:34 AM        Lab Results   Component Value Date/Time    CHOLEST 125 08/25/2022 07:34 AM    HDL 32 (L) 08/25/2022 07:34 AM    TRIG 143 08/25/2022 07:34 AM    LDL 68 08/25/2022 07:34 AM    NONHDLC 93 08/25/2022 07:34 AM       Lab Results   Component Value Date/Time    A1C 5.8 (H) 08/25/2022 07:34 AM            ASSESSMENT AND PLAN:   1. Physical exam, annual  Patient is here for the physical exam.  Age appropriate screenings discussed and orders placed.  The patient indicates understanding of these issues and agrees to the plan.  Sunscreen use recommended  Annual eye exam recommended  LSM with dietary modifications discussed    2. Essential hypertension  BP is well controlled currently with Metoprolol 25 mg daily  - CARD ECHO 2D DOPPLER (CPT=93306); Future    3. Mixed hyperlipidemia  Patient is currently on atorvastatin 80 mg daily. Tolerating well. Will repeat lipid panel.    4. History of ST elevation myocardial infarction (STEMI)  - CARD ECHO 2D DOPPLER (CPT=93306); Future    5. Atherosclerosis of native coronary artery of native heart without angina pectoris  6. Status post insertion of drug eluting coronary artery stent  Referral to Cardiology provided. No current symptoms. Will order ECHO.    7. Current smoker  Due for AAA screening. No imaging to assess the aorta  in the past.  - US ABDOMINAL AORTIC ANEURYSM SCREENING (CPT=76706); Future    8. Screening for deficiency anemia  - CBC With Differential With Platelet; Future    9. Screening for endocrine, metabolic and immunity disorder  - Comp Metabolic Panel (14); Future    10. Screening for diabetes mellitus  - Hemoglobin A1C; Future    11. Screening for thyroid disorder  - TSH W Reflex To Free T4; Future    12. Colon cancer screening  Patient does not want colonoscopy, however is agreeable to do cologuard.  - COLOGUARD COLON CANCER SCREENING (EXTERNAL)    13. Syncope, unspecified syncope type  Patient recently had syncopal episode while he was in the line to get food (he was starving), which was thought to be due to orthostatic vs hypotensive episode, given patient was not eating/drinking well that day, had bad cold symptoms. He was evaluated in the ER in [facility] [place]. Fortunately, EKG, chest x-ray, cardiac enzymes are reassuring. Metabolic panel and CBC reassuring. Urinalysis negative for signs of infection. Negative for COVID-19, influenza and RSV. He was recommended admission for cardiac workup, however patient left AMA. Patient has not seen cardiology since 2019.  - Cardio Referral - Internal  - CARD ECHO 2D DOPPLER (CPT=93306); Future    14. Vitamin D deficiency  - Vitamin D; Future    15. Anemia, unspecified type  - Ferritin [E]; Future  - Iron And Tibc [E]; Future  - Vitamin B12 [E]; Future  - Folic Acid Serum [E]; Future    16. Prostate cancer screening  - PSA, Total (Screening) [E]; Future       Return in about 6 months (around 9/11/2024) for medication check.      Galina Gonzalez MD

## 2024-03-12 ENCOUNTER — PATIENT MESSAGE (OUTPATIENT)
Dept: INTERNAL MEDICINE CLINIC | Facility: CLINIC | Age: 69
End: 2024-03-12

## 2024-03-12 RX ORDER — ATORVASTATIN CALCIUM 80 MG/1
80 TABLET, FILM COATED ORAL NIGHTLY
Qty: 90 TABLET | Refills: 1 | Status: SHIPPED | OUTPATIENT
Start: 2024-03-12

## 2024-03-12 NOTE — TELEPHONE ENCOUNTER
From: Silver Virk  To: Galina Gonzalez  Sent: 3/12/2024 12:40 PM CDT  Subject: Refill of Cholesterol Medication    Hello - I need my cholesterol medication refilled - please and thank you!

## 2024-03-12 NOTE — TELEPHONE ENCOUNTER
Last OV relevant to medication: 3/11/24  Last refill date: new to   When pt was asked to return for OV: 9/11/24  Upcoming appt/reason: No future appointments.  Was pt informed of any over due labs: utd  Lab Results   Component Value Date    CHOLEST 255 (H) 03/11/2024    TRIG 171 (H) 03/11/2024    HDL 37 (L) 03/11/2024     (H) 03/11/2024    VLDL 36 (H) 03/11/2024    NONHDLC 218 (H) 03/11/2024

## 2024-03-13 ENCOUNTER — TELEPHONE (OUTPATIENT)
Dept: INTERNAL MEDICINE CLINIC | Facility: CLINIC | Age: 69
End: 2024-03-13

## 2024-03-13 NOTE — TELEPHONE ENCOUNTER
Outreach was made to inform pt of overdue colonoscopy. Pt was last seen in office 3/11/24 Patient does not want colonoscopy, however is agreeable to do cologuard.

## 2024-03-14 RX ORDER — ATORVASTATIN CALCIUM 80 MG/1
80 TABLET, FILM COATED ORAL NIGHTLY
Qty: 90 TABLET | Refills: 1 | OUTPATIENT
Start: 2024-03-14

## 2024-07-26 ENCOUNTER — PATIENT OUTREACH (OUTPATIENT)
Dept: INTERNAL MEDICINE CLINIC | Facility: CLINIC | Age: 69
End: 2024-07-26

## 2024-08-23 RX ORDER — ATORVASTATIN CALCIUM 80 MG/1
80 TABLET, FILM COATED ORAL NIGHTLY
Qty: 90 TABLET | Refills: 0 | Status: SHIPPED | OUTPATIENT
Start: 2024-08-23

## 2024-08-23 NOTE — TELEPHONE ENCOUNTER
Cholesterol Medication Protocol [id number]/19/2024 03:26 AM   Protocol Details ALT < 80    ALT resulted within past year    Lipid panel within past 12 months    In person appointment or virtual visit in the past 12 mos or appointment in next 3 mos      3. Mixed hyperlipidemia  Patient is currently on atorvastatin 80 mg daily. Tolerating well.   No future appointments.

## 2024-12-19 RX ORDER — ATORVASTATIN CALCIUM 80 MG/1
80 TABLET, FILM COATED ORAL NIGHTLY
Qty: 90 TABLET | Refills: 0 | Status: SHIPPED | OUTPATIENT
Start: 2024-12-19

## 2024-12-19 NOTE — TELEPHONE ENCOUNTER
Cholesterol Medication Protocol Lehyvf3112/17/2024 08:35 AM   Protocol Details ALT < 80    ALT resulted within past year    Lipid panel within past 12 months    In person appointment or virtual visit in the past 12 mos or appointment in next 3 mos   3. Mixed hyperlipidemia  Patient is currently on atorvastatin 80 mg daily. Tolerating well. Will repeat lipid panel  No future appointments.

## 2025-01-09 ENCOUNTER — TELEMEDICINE (OUTPATIENT)
Dept: INTERNAL MEDICINE CLINIC | Facility: CLINIC | Age: 70
End: 2025-01-09
Payer: COMMERCIAL

## 2025-01-09 DIAGNOSIS — J11.1 FLU: Primary | ICD-10-CM

## 2025-01-09 RX ORDER — OSELTAMIVIR PHOSPHATE 75 MG/1
75 CAPSULE ORAL 2 TIMES DAILY
Qty: 10 CAPSULE | Refills: 0 | Status: SHIPPED | OUTPATIENT
Start: 2025-01-09 | End: 2025-01-14

## 2025-01-09 NOTE — PROGRESS NOTES
TELEHEALTH VIDEO VISIT    I spoke with Silver Virk by secure Epic  video service on 01/09/25, verified date of birth, patient stated they are in the state of [place], and discussed their concerns as below:      Wife started to have symptoms 5 days ago, was diagnosed with flu, was given Tamiflu.  Patient started this morning , had 100.0 temperature, took 1000 mg Tylenol with improvement    Not vaccinated from flu.    There were no vitals filed for this visit.  There is no height or weight on file to calculate BMI.  BP Readings from Last 3 Encounters:   03/11/24 112/60   08/24/22 112/54   08/13/21 112/64     The ASCVD Risk score (Jovani BLANC, et al., 2019) failed to calculate for the following reasons:    Risk score cannot be calculated because patient has a medical history suggesting prior/existing ASCVD  Reviewed Current Medications:  Current Outpatient Medications   Medication Sig Dispense Refill    oseltamivir (TAMIFLU) 75 MG Oral Cap Take 1 capsule (75 mg total) by mouth 2 (two) times daily for 5 days. 10 capsule 0    atorvastatin 80 MG Oral Tab Take 1 tablet (80 mg total) by mouth nightly. 90 tablet 0    metoprolol succinate ER 25 MG Oral Tablet 24 Hr Take 1 tablet (25 mg total) by mouth daily.      aspirin 81 MG Oral Tab EC Take 1 tablet (81 mg total) by mouth daily. 90 tablet 1    Ergocalciferol (VITAMIN D OR) Take by mouth daily. (Patient not taking: Reported on 1/9/2025)         Assessment & Plan    1. Flu (Primary)  Given that patient's wife was tested positive for flu recently was prescribed oseltamivir, I have a high suspicion that patient has flu as well.  Will prescribe oseltamivir.  Discussed other supportive management with the patient.  All questions answered.  Recommend to get pulse oximeter and monitor oxygen saturation at home.  If less than 92% needs to seek medical attention.  Recommend to call back to clinic if after 7 days symptoms get worse or do not seem to be improving.  -      Oseltamivir Phosphate; Take 1 capsule (75 mg total) by mouth 2 (two) times daily for 5 days.  Dispense: 10 capsule; Refill: 0        Follow Up: As needed/if symptoms worsen     Objective  GENERAL: alert and oriented x3. No distress.   EYES:no scleral icterus.   LUNGS: no labored breathing, no dyspnea.     Reviewed:  Patient Active Problem List    Diagnosis    Atherosclerosis of coronary artery     Last Assessment & Plan:    Formatting of this note might be different from the original.   History of STEMI status post SREEKANTH to Cx in  and stent to LAD in .   Continue present management.      Syncope    History of ST elevation myocardial infarction (STEMI)      and       Status post insertion of drug eluting coronary artery stent      LAD, 2019 Cx      Dyslipidemia    Essential hypertension    Heparin allergy      Allergies[1]     Social History     Socioeconomic History    Marital status:    Tobacco Use    Smoking status: Some Days     Current packs/day: 0.00     Types: Cigarettes     Last attempt to quit: 1994     Years since quittin.1     Passive exposure: Current    Smokeless tobacco: Never    Tobacco comments:     2 per day   Vaping Use    Vaping status: Never Used   Substance and Sexual Activity    Alcohol use: Yes     Comment: Maybe once per month    Drug use: Yes     Types: Cannabis     Comment: in     Sexual activity: Yes     Partners: Female   Other Topics Concern    Caffeine Concern No    Exercise No    Seat Belt Yes    Special Diet No    Stress Concern No    Weight Concern No     Service No    Blood Transfusions No     Social Drivers of Health     Physical Activity: Inactive (2020)    Received [facility]    Exercise Vital Sign     Days of Exercise per Week: 0 days     Minutes of Exercise per Session: 0 min    Received from [facility], [facility]    Housing Stability       Review of Systems   Constitutional:  Positive for chills and fever. Negative for appetite change and fatigue.   HENT:  Negative for congestion, ear pain, sinus pressure, sinus pain, sore throat and voice change.    Eyes: Negative.    Respiratory:  Positive for cough (nonproductive).    Gastrointestinal:  Negative for abdominal pain, constipation, diarrhea, nausea and vomiting.   Musculoskeletal: Negative.    Skin: Negative.    Neurological:  Negative for dizziness, light-headedness and headaches.   Hematological:  Negative for adenopathy.     All other systems negative unless stated in ROS or in HPI.       Galina Gonzalez MD  Internal Medicine       Call office with any questions or seek emergency care if necessary.   Patient understands and agrees to follow directions and advice.    Telehealth Verbal Consent   I conducted a telehealth visit with Silver Virk today, 01/09/25, which was completed using two-way, real-time interactive audio and video communication. This has been done in good jerry to provide continuity of care in the best interest of the provider-patient relationship. Every conscious effort was taken to allow for sufficient and adequate time to complete the visit.The patient was made aware of the limitations of the telehealth visit, including treatment limitations as no physical exam could be performed.  The patient was advised to call 911 or to go to the ER in case there was an emergency.  The patient was also advised of the potential privacy & security concerns related to the telehealth platform. The patient was made aware of where to find [facility]'s notice of privacy practices, telehealth consent form and other related consent forms and documents.  which are located on the [facility] website. The patient verbally agreed to telehealth consent form, related consents and the risks discussed.  Lastly, the patient confirmed that they were in [place]. Included in this visit, time may have been spent reviewing labs,  medications, radiology tests and decision making. Appropriate medical decision-making and tests are ordered as detailed in the plan of care above.  Coding/billing information is submitted for this visit based on complexity of care and/or time spent for the visit.  ----------------------------------------- PATIENT INSTRUCTIONS-----------------------------------------     There are no Patient Instructions on file for this visit.              [1]   Allergies  Allergen Reactions    Heparin BLEEDING     Pt. Has heparin antibody    Heparin OTHER (SEE COMMENTS)

## 2025-03-25 DIAGNOSIS — Z13.1 SCREENING FOR DIABETES MELLITUS: ICD-10-CM

## 2025-03-25 DIAGNOSIS — Z13.0 SCREENING FOR ENDOCRINE, METABOLIC AND IMMUNITY DISORDER: ICD-10-CM

## 2025-03-25 DIAGNOSIS — Z13.228 SCREENING FOR ENDOCRINE, METABOLIC AND IMMUNITY DISORDER: ICD-10-CM

## 2025-03-25 DIAGNOSIS — Z13.29 SCREENING FOR THYROID DISORDER: Primary | ICD-10-CM

## 2025-03-25 DIAGNOSIS — Z13.29 SCREENING FOR ENDOCRINE, METABOLIC AND IMMUNITY DISORDER: ICD-10-CM

## 2025-03-25 DIAGNOSIS — Z12.5 PROSTATE CANCER SCREENING: ICD-10-CM

## 2025-03-25 DIAGNOSIS — E55.9 VITAMIN D DEFICIENCY: ICD-10-CM

## 2025-03-25 DIAGNOSIS — Z00.00 PHYSICAL EXAM, ANNUAL: ICD-10-CM

## 2025-03-25 DIAGNOSIS — E78.2 MIXED HYPERLIPIDEMIA: ICD-10-CM

## 2025-03-25 NOTE — TELEPHONE ENCOUNTER
Physical scheduled 3/28/25 with Dr. Gonzalez. Pt also wondering if any blood work needs to be completed before appointment. Please advise pt. Thanks!

## 2025-03-25 NOTE — TELEPHONE ENCOUNTER
Last OV relevant to medication: 3/11/24  Last refill date: 12/19/24 #90/refills: 0  When pt was asked to return for OV: 9/11/24  Upcoming appt/reason: No future appointments.  Was pt informed of any over due labs: utd  Lab Results   Component Value Date    CHOLEST 255 (H) 03/11/2024    TRIG 171 (H) 03/11/2024    HDL 37 (L) 03/11/2024     (H) 03/11/2024    VLDL 36 (H) 03/11/2024    NONHDLC 218 (H) 03/11/2024         Pt due for annual physical exam, please call to schedule thanks!!

## 2025-03-26 RX ORDER — ATORVASTATIN CALCIUM 80 MG/1
80 TABLET, FILM COATED ORAL NIGHTLY
Qty: 90 TABLET | Refills: 0 | Status: SHIPPED | OUTPATIENT
Start: 2025-03-26 | End: 2025-03-28

## 2025-03-28 ENCOUNTER — OFFICE VISIT (OUTPATIENT)
Dept: INTERNAL MEDICINE CLINIC | Facility: CLINIC | Age: 70
End: 2025-03-28
Payer: COMMERCIAL

## 2025-03-28 VITALS
RESPIRATION RATE: 20 BRPM | WEIGHT: 202 LBS | SYSTOLIC BLOOD PRESSURE: 136 MMHG | DIASTOLIC BLOOD PRESSURE: 70 MMHG | TEMPERATURE: 98 F | OXYGEN SATURATION: 99 % | BODY MASS INDEX: 26.77 KG/M2 | HEART RATE: 80 BPM | HEIGHT: 73 IN

## 2025-03-28 DIAGNOSIS — R73.03 PREDIABETES: ICD-10-CM

## 2025-03-28 DIAGNOSIS — Z12.11 COLON CANCER SCREENING: ICD-10-CM

## 2025-03-28 DIAGNOSIS — Z00.00 PHYSICAL EXAM, ANNUAL: Primary | ICD-10-CM

## 2025-03-28 DIAGNOSIS — Z13.6 SCREENING FOR AAA (ABDOMINAL AORTIC ANEURYSM): ICD-10-CM

## 2025-03-28 DIAGNOSIS — F17.200 CURRENT SMOKER: ICD-10-CM

## 2025-03-28 DIAGNOSIS — D22.9 NUMEROUS MOLES: ICD-10-CM

## 2025-03-28 DIAGNOSIS — I25.2 HISTORY OF ST ELEVATION MYOCARDIAL INFARCTION (STEMI): ICD-10-CM

## 2025-03-28 DIAGNOSIS — Z71.6 ENCOUNTER FOR TOBACCO USE CESSATION COUNSELING: ICD-10-CM

## 2025-03-28 DIAGNOSIS — E78.2 MIXED HYPERLIPIDEMIA: ICD-10-CM

## 2025-03-28 DIAGNOSIS — Z95.5 STATUS POST INSERTION OF DRUG ELUTING CORONARY ARTERY STENT: ICD-10-CM

## 2025-03-28 DIAGNOSIS — I10 ESSENTIAL HYPERTENSION: ICD-10-CM

## 2025-03-28 RX ORDER — ATORVASTATIN CALCIUM 80 MG/1
80 TABLET, FILM COATED ORAL NIGHTLY
Qty: 90 TABLET | Refills: 1 | Status: SHIPPED | OUTPATIENT
Start: 2025-03-28

## 2025-03-28 RX ORDER — METOPROLOL SUCCINATE 25 MG/1
25 TABLET, EXTENDED RELEASE ORAL DAILY
Qty: 90 TABLET | Refills: 1 | Status: SHIPPED | OUTPATIENT
Start: 2025-03-28

## 2025-03-28 NOTE — PATIENT INSTRUCTIONS
VISIT SUMMARY:  You came in today for your annual physical exam. We reviewed your history of coronary artery disease, hypertension, hyperlipidemia, and prediabetes. You are physically active and have made some dietary changes, though you still enjoy sweets. You have reduced your smoking significantly and drink alcohol infrequently. You use marijuana daily for relaxation. We discussed your vaccination history and the importance of completing your colorectal cancer screening.     YOUR PLAN:  -CORONARY ARTERY DISEASE: Coronary artery disease is a condition where the blood vessels supplying your heart are narrowed or blocked. You are currently asymptomatic, but it's important to have periodic follow-ups with a cardiologist to assess your heart function. Continue taking atorvastatin 80 mg, metoprolol 25 mg, and aspirin 81 mg daily. We will refer you to cardiology for a follow-up .    -HYPERTENSION: Hypertension is high blood pressure. Your condition is well-managed with your current medication, and you have no symptoms of dizziness or lightheadedness. Continue your current treatment.    -HYPERLIPIDEMIA: Hyperlipidemia is having high levels of fats (lipids) in your blood. You are managing this with atorvastatin and have made some dietary changes. Continue your current treatment and try to reduce your intake of sweets.    -PREDIABETES: Prediabetes means your blood sugar levels are higher than normal but not high enough to be classified as diabetes. Your hemoglobin A1c was 6.2% in 2024. It's important to reduce your intake of sweets to manage this condition. We will order fasting blood glucose and hemoglobin A1c tests.    -TOBACCO USE: You have significantly reduced your smoking to 1-2 cigarettes per month. However, complete cessation is important, especially due to your coronary artery disease. Keep working towards quitting entirely.    -COLORECTAL CANCER SCREENING: You have not completed your colorectal cancer screening,  which is important due to your family history. We will order a Cologuard test and encourage you to complete it.    -ABDOMINAL AORTIC ANEURYSM SCREENING: An abdominal aortic aneurysm is an enlarged area in the lower part of the major vessel that supplies blood to the body. Screening is important due to your coronary artery disease. We will order this screening for you.    -GENERAL HEALTH MAINTENANCE: You are physically active and have reduced your smoking and alcohol consumption. You have not received flu or COVID vaccinations but received the Prevnar 20 vaccine in 2022. You are considering the shingles vaccine. We recommend getting the shingles vaccine at a pharmacy .    -DERMATOLOGICAL HEALTH: You have multiple moles but no suspicious lesions. Regular use of sunscreen is good. We will refer you to a dermatologist for a full skin check.    -DENTAL HEALTH: Your full dentures need replacement due to wear. We advise you to obtain new dentures.    INSTRUCTIONS:  1. Follow up with cardiology.  2. Complete the Cologuard test for colorectal cancer screening.  3. Get an abdominal aortic aneurysm screening.  4. Get the shingles vaccine at a pharmacy.  5. Get flu and COVID vaccinations.  6. Follow up with a dermatologist for a full skin check.  7. Obtain new dentures.    Contains text generated by Roel

## 2025-03-28 NOTE — PROGRESS NOTES
[facility]    CHIEF COMPLAINT:   Chief Complaint   Patient presents with    Routine Physical     3/11/24 Psa,              The following individual(s) verbally consented to be recorded using ambient AI listening technology and understand that they can each withdraw their consent to this listening technology at any point by asking the clinician to turn off or pause the recording:    Patient name: Silver Virk       HPI:   Silver Virk is a 69 year old male who presents for a complete physical exam.      Patient Active Problem List   Diagnosis    Essential hypertension    Heparin allergy    Prediabetes    Dyslipidemia    History of ST elevation myocardial infarction (STEMI)    Status post insertion of drug eluting coronary artery stent    Atherosclerosis of coronary artery    Syncope    Mixed hyperlipidemia    Numerous moles        History of Present Illness  Silver Virk is a 69 year old male with PMH of CAD (ACS x 2 in 1999 and 2019 with LAD and Lcx stents), hypertension, HLD who presents for an annual physical exam.    He has a history of coronary artery disease with a stent placement in 1999 and 2019, hypertension, and hyperlipidemia. 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 He has reduced his work hours from 12 to 8-10 hours a day. He continues to enjoy sweets despite efforts to eat healthier.    He has a history of smoking, now reduced to one or two cigarettes a month, typically when socializing. He describes smoking as a difficult habit to quit. He consumes alcohol infrequently, about once a month, and uses marijuana daily after work as a form of relaxation.    He has not received a flu shot or COVID-19 vaccination but received the Prevnar 20 vaccine in 2022. He is considering the shingles vaccine. He has not completed a colonoscopy or Cologuard test despite a family history of colon cancer in his  sister, diagnosed in her mid-thirties. He is prediabetic with a hemoglobin A1c of 6.2 in  and has attempted dietary changes, though he continues to consume sweets.    He is currently taking atorvastatin 80 mg, metoprolol 25 mg, and aspirin 81 mg daily. He occasionally takes vitamin D. He has not had a recent cardiology follow-up since his last cardiac echo and treadmill stress test in 2019.    No symptoms of shortness of breath, chest pain, dizziness, or lightheadedness. He experiences constipation related to dietary habits but denies any blood in the stool. He wears glasses with a recent prescription change in December and has full dentures, which he plans to replace. He reports using sunscreen now, though he did not in his youth. He has varicose veins and occasional sciatica but denies any significant pain or leg swelling. He has a history of ear wax buildup despite using drops regularly.       Occupation: Maintenance , physical job, full time    Diet: Tries to eat Healthy, eats vegetables, loves sweets   Exercise:  Work is very physical, 8-10 hours a day, works Maintenance work. Walking every day 10 000 steps.  Eye exam: every year, last was 2024, wears glasses, no contacts , prescription changes every year.   Dentist: full dentures, not going to dentist   Driving: Yes, with the seatbelt  Smokin-2 cigarettes once a month   Alcohol: socially    Smokes marijuana once a day after work     Vaccinations:  Influenza: Does not want to do flu shots.  COVID: Not Vaccinated.  Pneumococcal: PCV 20 - 2022  Shingles: due, will think about it  Tdap/Td:  2022    Screenings:  PSA: 2024, No family history of prostate cancer  Colonoscopy: -, Due,  family history of Colon cancer in his sister - in her mid 30s  Diabetes screening: Last A1c value was 6.2% done 3/11/2024.    Dexa:Not on file  Lipid screening: Cholesterol: 255, done on 3/11/2024.  HDL Cholesterol: 37, done on 3/11/2024.  TriGlycerides 171,  done on 3/11/2024.  LDL Cholesterol: 186, done on 3/11/2024.     AAA ultrasound screen? (age 65-75 with any smoking history): Ordered today  Aspirin use? (age 50-59 with ASCVD risk 10% or greater): Uses      Wt Readings from Last 6 Encounters:   03/28/25 202 lb (91.6 kg)   03/11/24 202 lb (91.6 kg)   08/24/22 201 lb 12.8 oz (91.5 kg)   08/13/21 207 lb (93.9 kg)   12/05/19 206 lb 8 oz (93.7 kg)   12/03/19 209 lb (94.8 kg)     Body mass index is 26.65 kg/m².       Current Outpatient Medications   Medication Sig Dispense Refill    atorvastatin 80 MG Oral Tab Take 1 tablet (80 mg total) by mouth nightly. 90 tablet 0    Ergocalciferol (VITAMIN D OR) Take by mouth daily.      metoprolol succinate ER 25 MG Oral Tablet 24 Hr Take 1 tablet (25 mg total) by mouth daily.      aspirin 81 MG Oral Tab EC Take 1 tablet (81 mg total) by mouth daily. 90 tablet 1      Past Medical History:    Acute ST elevation myocardial infarction (STEMI) (HCC)    Atherosclerosis of coronary artery    Essential hypertension    Hyperlipidemia    Myocardial infarction (HCC)    Pre-diabetes    Status post insertion of drug eluting coronary artery stent    2005 LAD, 2020 Cx      Past Surgical History:   Procedure Laterality Date    Cath bare metal stent (bms)  1999    unsure    Cath drug eluting stent  11/30/2019    circumflex x2    Cath percutaneous  transluminal coronary angioplasty      Stent place pta ea add artery  1999      Family History   Problem Relation Age of Onset    Diabetes Father     Hypertension Father     Other (parkinsons) Father     Diabetes Mother         insulin shock    Kidney Disease Sister     Diabetes Brother     No Known Problems Brother     No Known Problems Sister     No Known Problems Sister       Social History:   Social History     Socioeconomic History    Marital status:    Tobacco Use    Smoking status: Some Days     Current packs/day: 0.00     Types: Cigarettes     Last attempt to quit: 11/30/1994     Years  since quittin.3     Passive exposure: Current    Smokeless tobacco: Never    Tobacco comments:     2 per day   Vaping Use    Vaping status: Never Used   Substance and Sexual Activity    Alcohol use: Yes     Comment: Maybe once per month    Drug use: Yes     Types: Cannabis     Comment: in     Sexual activity: Yes     Partners: Female   Other Topics Concern    Caffeine Concern No    Exercise No    Seat Belt Yes    Special Diet No    Stress Concern No    Weight Concern No     Service No    Blood Transfusions No     Social Drivers of Health     Food Insecurity: No Food Insecurity (3/28/2025)    NCSS - Food Insecurity     Worried About Running Out of Food in the Last Year: No     Ran Out of Food in the Last Year: No   Transportation Needs: No Transportation Needs (3/28/2025)    NCSS - Transportation     Lack of Transportation: No   Housing Stability: Not At Risk (3/28/2025)    NCSS - Housing/Utilities     Has Housing: Yes     Worried About Losing Housing: No     Unable to Get Utilities: No          REVIEW OF SYSTEMS:   Review of Systems   Constitutional:  Negative for chills and fever.   HENT:  Negative for congestion, ear pain, hearing loss and sinus pain.    Eyes:  Negative for blurred vision, double vision and pain.   Respiratory:  Negative for cough, shortness of breath and wheezing.    Cardiovascular:  Negative for chest pain, palpitations and leg swelling.   Gastrointestinal:  Negative for abdominal pain, constipation, diarrhea, heartburn, nausea and vomiting.   Musculoskeletal:  Negative for back pain and joint pain.   Skin:  Negative for rash.   Neurological:  Negative for dizziness and headaches.   Psychiatric/Behavioral: Negative.        EXAM:   /70 (BP Location: Right arm, Patient Position: Sitting, Cuff Size: adult)   Pulse 80   Temp 98 °F (36.7 °C) (Temporal)   Resp 20   Ht 6' 1\" (1.854 m)   Wt 202 lb (91.6 kg)   SpO2 99%   BMI 26.65 kg/m²   Body mass index is 26.65 kg/m².    Physical Exam  Vitals reviewed.   Constitutional:       General: He is not in acute distress.     Appearance: Normal appearance. He is not ill-appearing or toxic-appearing.   HENT:      Head: Normocephalic and atraumatic.      Right Ear: Tympanic membrane, ear canal and external ear normal.      Left Ear: Tympanic membrane, ear canal and external ear normal.      Mouth/Throat:      Mouth: Mucous membranes are moist.      Pharynx: Oropharynx is clear. No oropharyngeal exudate or posterior oropharyngeal erythema.   Eyes:      Extraocular Movements: Extraocular movements intact.      Conjunctiva/sclera: Conjunctivae normal.      Pupils: Pupils are equal, round, and reactive to light.   Cardiovascular:      Rate and Rhythm: Normal rate and regular rhythm.      Pulses: Normal pulses.      Heart sounds: Normal heart sounds. No murmur heard.     No friction rub. No gallop.   Pulmonary:      Effort: Pulmonary effort is normal. No respiratory distress.      Breath sounds: Normal breath sounds. No stridor. No wheezing, rhonchi or rales.   Abdominal:      General: Abdomen is flat. There is no distension.      Palpations: There is no mass.      Tenderness: There is no abdominal tenderness. There is no right CVA tenderness, left CVA tenderness, guarding or rebound.      Hernia: No hernia is present.   Musculoskeletal:      Right lower leg: No edema.      Left lower leg: No edema.   Skin:     General: Skin is warm.      Capillary Refill: Capillary refill takes less than 2 seconds.      Coloration: Skin is not jaundiced or pale.      Findings: No bruising, erythema, lesion or rash.   Neurological:      General: No focal deficit present.      Mental Status: He is alert and oriented to person, place, and time.      Cranial Nerves: No cranial nerve deficit.      Sensory: No sensory deficit.      Motor: No weakness.   Psychiatric:         Mood and Affect: Mood normal.         Behavior: Behavior normal.         Thought Content:  Thought content normal.         Judgment: Judgment normal.        Labs:   Lab Results   Component Value Date/Time    WBC 10.8 03/11/2024 09:00 AM    HGB 13.8 03/11/2024 09:00 AM    .0 03/11/2024 09:00 AM      Lab Results   Component Value Date/Time     (H) 03/11/2024 09:00 AM     03/11/2024 09:00 AM    K 4.3 03/11/2024 09:00 AM     03/11/2024 09:00 AM    CO2 28.0 03/11/2024 09:00 AM    CREATSERUM 1.05 03/11/2024 09:00 AM    CA 9.4 03/11/2024 09:00 AM    ALB 3.9 03/11/2024 09:00 AM    TP 7.6 03/11/2024 09:00 AM    ALKPHO 73 03/11/2024 09:00 AM    AST 20 03/11/2024 09:00 AM    ALT 19 03/11/2024 09:00 AM    BILT 0.4 03/11/2024 09:00 AM    TSH 1.500 03/11/2024 09:00 AM        Lab Results   Component Value Date/Time    CHOLEST 255 (H) 03/11/2024 09:00 AM    HDL 37 (L) 03/11/2024 09:00 AM    TRIG 171 (H) 03/11/2024 09:00 AM     (H) 03/11/2024 09:00 AM    NONHDLC 218 (H) 03/11/2024 09:00 AM       Lab Results   Component Value Date/Time    A1C 6.2 (H) 03/11/2024 09:00 AM      Vitamin D:    Lab Results   Component Value Date    VITD 28.4 (L) 03/11/2024           ASSESSMENT AND PLAN:   Silver Virk is a 69 year old male who presents for a complete physical exam.     1. Physical exam, annual  Pt' s weight is Body mass index is 26.65 kg/m².. Recommended regular exercise.   Age appropriate screenings discussed and orders placed.  The patient indicates understanding of these issues and agrees to the plan.  Daily sunscreen - recommended to continue  Annual eye exam and Q 6 month dental exam recommended    2. Colon cancer screening  Incomplete colorectal cancer screening. Family history of colon cancer in sister diagnosed in her mid-thirties. Discussed importance of screening and encouraged Cologuard test completion.    - Order Cologuard test and encourage completion    - COLOGUARD COLON CANCER SCREENING (EXTERNAL)    3. Screening for AAA (abdominal aortic aneurysm)  Incomplete abdominal aortic  aneurysm screening. Previous recommendation not followed. Discussed need for screening due to coronary artery disease.    - Order abdominal aortic aneurysm screening    - US ABDOMINAL AORTIC ANEURYSM SCREENING (CPT=76706); Future    4. Numerous moles  - Derm Referral - In Network    5. Status post insertion of drug eluting coronary artery stent  - Cardio Referral - Internal    6. Essential hypertension  Chronic hypertension, well-managed with medication. No symptoms of dizziness or lightheadedness.    - CARD ECHO 2D DOPPLER (CPT=93306); Future    7. History of ST elevation myocardial infarction (STEMI)  - CARD ECHO 2D DOPPLER (CPT=93306); Future    8. Encounter for tobacco use cessation counseling  - Smoking Cessation 3-10 minutes (96971)    9. Current smoker  Reduced smoking to occasional use (1-2 cigarettes per month). Discussed importance of complete cessation due to coronary artery disease.   - Smoking Cessation 3-10 minutes (86780)     10. CAD  Coronary artery disease with stent placement in 1999 and 2019. Asymptomatic for chest pain, dyspnea, or dizziness. Last cardiac evaluation in 2019 included echocardiogram and treadmill stress test. Emphasized periodic cardiology follow-up to assess cardiac function and potential need for repeat stress echocardiogram.    - Refer to cardiology for follow-up and potential repeat stress echocardiogram    - Continue atorvastatin 80 mg daily    - Continue metoprolol 25 mg daily    - Continue aspirin 81 mg daily    - Will order 2D ECHO    11. HLD  Chronic hyperlipidemia, managed with atorvastatin. Reports dietary changes but continues to consume sweets    12. Prediabetes  Prediabetes with hemoglobin A1c of 6.2% in 2024. Dietary changes made, but continues to consume sweets. Emphasized reducing sweets to manage prediabetes.    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 Reduced smoking and alcohol consumption. Daily marijuana use. No flu or COVID vaccinations. Received Prevnar 20 in 2022. Considering shingles vaccination. Discussed insurance coverage issues for shingles vaccine and recommended obtaining it at a pharmacy.    - Recommend shingles vaccination at pharmacy   - Encourage flu and COVID vaccinations      Dermatological Health    Multiple moles present. No suspicious lesions. Regular sunscreen use.    - Refer to dermatologist for full skin check      Dental Health    Full dentures require replacement due to wear.    - Advise obtaining new dentures      Return in about 6 months (around 9/28/2025) for med check.      Galina Gonzalez MD

## 2025-04-02 ENCOUNTER — LAB ENCOUNTER (OUTPATIENT)
Dept: LAB | Age: 70
End: 2025-04-02
Attending: STUDENT IN AN ORGANIZED HEALTH CARE EDUCATION/TRAINING PROGRAM
Payer: COMMERCIAL

## 2025-04-02 DIAGNOSIS — Z13.29 SCREENING FOR THYROID DISORDER: ICD-10-CM

## 2025-04-02 DIAGNOSIS — Z12.5 PROSTATE CANCER SCREENING: ICD-10-CM

## 2025-04-02 DIAGNOSIS — Z13.0 SCREENING FOR ENDOCRINE, METABOLIC AND IMMUNITY DISORDER: ICD-10-CM

## 2025-04-02 DIAGNOSIS — Z13.29 SCREENING FOR ENDOCRINE, METABOLIC AND IMMUNITY DISORDER: ICD-10-CM

## 2025-04-02 DIAGNOSIS — E55.9 VITAMIN D DEFICIENCY: ICD-10-CM

## 2025-04-02 DIAGNOSIS — E78.2 MIXED HYPERLIPIDEMIA: ICD-10-CM

## 2025-04-02 DIAGNOSIS — Z13.228 SCREENING FOR ENDOCRINE, METABOLIC AND IMMUNITY DISORDER: ICD-10-CM

## 2025-04-02 DIAGNOSIS — Z00.00 PHYSICAL EXAM, ANNUAL: ICD-10-CM

## 2025-04-02 DIAGNOSIS — Z13.1 SCREENING FOR DIABETES MELLITUS: ICD-10-CM

## 2025-04-02 LAB
ALBUMIN SERPL-MCNC: 4.4 G/DL (ref 3.2–4.8)
ALBUMIN/GLOB SERPL: 1.8 {RATIO} (ref 1–2)
ALP LIVER SERPL-CCNC: 75 U/L
ALT SERPL-CCNC: 19 U/L
ANION GAP SERPL CALC-SCNC: 8 MMOL/L (ref 0–18)
AST SERPL-CCNC: 20 U/L (ref ?–34)
BASOPHILS # BLD AUTO: 0.05 X10(3) UL (ref 0–0.2)
BASOPHILS NFR BLD AUTO: 0.6 %
BILIRUB SERPL-MCNC: 0.5 MG/DL (ref 0.2–1.1)
BUN BLD-MCNC: 22 MG/DL (ref 9–23)
CALCIUM BLD-MCNC: 9.5 MG/DL (ref 8.7–10.6)
CHLORIDE SERPL-SCNC: 109 MMOL/L (ref 98–112)
CHOLEST SERPL-MCNC: 143 MG/DL (ref ?–200)
CO2 SERPL-SCNC: 26 MMOL/L (ref 21–32)
COMPLEXED PSA SERPL-MCNC: 0.54 NG/ML (ref ?–4)
CREAT BLD-MCNC: 1.05 MG/DL
EGFRCR SERPLBLD CKD-EPI 2021: 77 ML/MIN/1.73M2 (ref 60–?)
EOSINOPHIL # BLD AUTO: 0.17 X10(3) UL (ref 0–0.7)
EOSINOPHIL NFR BLD AUTO: 2.2 %
ERYTHROCYTE [DISTWIDTH] IN BLOOD BY AUTOMATED COUNT: 12.8 %
EST. AVERAGE GLUCOSE BLD GHB EST-MCNC: 126 MG/DL (ref 68–126)
FASTING PATIENT LIPID ANSWER: YES
FASTING STATUS PATIENT QL REPORTED: YES
GLOBULIN PLAS-MCNC: 2.5 G/DL (ref 2–3.5)
GLUCOSE BLD-MCNC: 93 MG/DL (ref 70–99)
HBA1C MFR BLD: 6 % (ref ?–5.7)
HCT VFR BLD AUTO: 40.7 %
HDLC SERPL-MCNC: 30 MG/DL (ref 40–59)
HGB BLD-MCNC: 13.4 G/DL
IMM GRANULOCYTES # BLD AUTO: 0.02 X10(3) UL (ref 0–1)
IMM GRANULOCYTES NFR BLD: 0.3 %
LDLC SERPL CALC-MCNC: 87 MG/DL (ref ?–100)
LYMPHOCYTES # BLD AUTO: 2.25 X10(3) UL (ref 1–4)
LYMPHOCYTES NFR BLD AUTO: 29.1 %
MCH RBC QN AUTO: 29.2 PG (ref 26–34)
MCHC RBC AUTO-ENTMCNC: 32.9 G/DL (ref 31–37)
MCV RBC AUTO: 88.7 FL
MONOCYTES # BLD AUTO: 0.54 X10(3) UL (ref 0.1–1)
MONOCYTES NFR BLD AUTO: 7 %
NEUTROPHILS # BLD AUTO: 4.71 X10 (3) UL (ref 1.5–7.7)
NEUTROPHILS # BLD AUTO: 4.71 X10(3) UL (ref 1.5–7.7)
NEUTROPHILS NFR BLD AUTO: 60.8 %
NONHDLC SERPL-MCNC: 113 MG/DL (ref ?–130)
OSMOLALITY SERPL CALC.SUM OF ELEC: 299 MOSM/KG (ref 275–295)
PLATELET # BLD AUTO: 202 10(3)UL (ref 150–450)
POTASSIUM SERPL-SCNC: 4.7 MMOL/L (ref 3.5–5.1)
PROT SERPL-MCNC: 6.9 G/DL (ref 5.7–8.2)
RBC # BLD AUTO: 4.59 X10(6)UL
SODIUM SERPL-SCNC: 143 MMOL/L (ref 136–145)
TRIGL SERPL-MCNC: 144 MG/DL (ref 30–149)
TSI SER-ACNC: 1.89 UIU/ML (ref 0.55–4.78)
VIT D+METAB SERPL-MCNC: 31.5 NG/ML (ref 30–100)
VLDLC SERPL CALC-MCNC: 23 MG/DL (ref 0–30)
WBC # BLD AUTO: 7.7 X10(3) UL (ref 4–11)

## 2025-04-02 PROCEDURE — 80061 LIPID PANEL: CPT | Performed by: STUDENT IN AN ORGANIZED HEALTH CARE EDUCATION/TRAINING PROGRAM

## 2025-04-02 PROCEDURE — 84153 ASSAY OF PSA TOTAL: CPT | Performed by: STUDENT IN AN ORGANIZED HEALTH CARE EDUCATION/TRAINING PROGRAM

## 2025-04-02 PROCEDURE — 80050 GENERAL HEALTH PANEL: CPT | Performed by: STUDENT IN AN ORGANIZED HEALTH CARE EDUCATION/TRAINING PROGRAM

## 2025-04-02 PROCEDURE — 83036 HEMOGLOBIN GLYCOSYLATED A1C: CPT | Performed by: STUDENT IN AN ORGANIZED HEALTH CARE EDUCATION/TRAINING PROGRAM

## 2025-04-02 PROCEDURE — 82306 VITAMIN D 25 HYDROXY: CPT | Performed by: STUDENT IN AN ORGANIZED HEALTH CARE EDUCATION/TRAINING PROGRAM

## 2025-04-08 LAB — AMB EXT COLOGUARD RESULT: NEGATIVE

## 2025-04-14 ENCOUNTER — TELEPHONE (OUTPATIENT)
Dept: INTERNAL MEDICINE CLINIC | Facility: CLINIC | Age: 70
End: 2025-04-14

## 2025-04-14 NOTE — TELEPHONE ENCOUNTER
Incoming (mail or fax):  fax   Received from:  [facility]  Documentation given to:  results bin

## 2025-04-18 ENCOUNTER — MED REC SCAN ONLY (OUTPATIENT)
Dept: INTERNAL MEDICINE CLINIC | Facility: CLINIC | Age: 70
End: 2025-04-18

## (undated) NOTE — LETTER
7/26/24        Gelacio Sheppard,      Our office is reaching out to Remind you to Complete your Cologuard Test.    Any questions or concerns please call our office directly at [phone].      Thank you for your time!          Sincerely,      RUTH ANN Lozano    [facility]

## (undated) NOTE — LETTER
3/13/2024       Silver Virk   [id number]   [place] [address]      Dear Silver,    IF YOU ARE 50 YEARS OF AGE OR OLDER, COLORECTAL CANCER SCREENING CAN SAVE YOUR LIFE.    As your primary care doctor, I want to do everything I can to protect your health.  Colorectal cancer is the second leading cause of cancer-related deaths in the [place].  Polyps and colorectal cancer do not always cause symptoms.  That's why screening is so important.  Regular screening can find colorectal cancer early when it is most curable.  These tests can help prevent the development of colorectal cancer.  All adults 50 and older should have one of the following colon cancer screenings as recommended by the American [employer] Society:    Colonoscopy every ten years or as advised by your physician  iFOBT every year (The iFOBT is a home test to detect blood in the stool. The test is quick, easy and requires no dietary restrictions.)    Please contact my office today so together we can determine which colorectal cancer screening is best for you.  Or, if you have already had one of the above colon cancer screenings, please let me know the date, method of screening, physician and/or facility that performed the screening so I can update your medical record.      If you have a history of colon cancer, colon polyps, or an abnormal colon screening result, please follow the instructions you have previously received from myself or your specialists.    There are no excuses to ignore early detection!  This is one cancer you can prevent.  Regular exams and preventive screenings are among the most important things you can do.  Thank you for taking time to take care of yourself.        Dr. Selam Nichols MD   [phone]

## (undated) NOTE — Clinical Note
Your patient was recently seen at the [facility] for a hospital follow-up visit. The visit note is attached. Please contact the clinic with any questions at [phone].     Thank you,  VILMA Resee